# Patient Record
Sex: FEMALE | Race: OTHER | HISPANIC OR LATINO | Employment: OTHER | URBAN - METROPOLITAN AREA
[De-identification: names, ages, dates, MRNs, and addresses within clinical notes are randomized per-mention and may not be internally consistent; named-entity substitution may affect disease eponyms.]

---

## 2018-06-27 ENCOUNTER — OFFICE VISIT (OUTPATIENT)
Dept: FAMILY MEDICINE CLINIC | Facility: CLINIC | Age: 38
End: 2018-06-27

## 2018-06-27 VITALS
HEIGHT: 62 IN | WEIGHT: 154 LBS | BODY MASS INDEX: 28.34 KG/M2 | HEART RATE: 72 BPM | OXYGEN SATURATION: 98 % | RESPIRATION RATE: 16 BRPM | DIASTOLIC BLOOD PRESSURE: 70 MMHG | TEMPERATURE: 98 F | SYSTOLIC BLOOD PRESSURE: 120 MMHG

## 2018-06-27 DIAGNOSIS — M54.42 CHRONIC BILATERAL LOW BACK PAIN WITH LEFT-SIDED SCIATICA: ICD-10-CM

## 2018-06-27 DIAGNOSIS — N94.6 DYSMENORRHEA, UNSPECIFIED: ICD-10-CM

## 2018-06-27 DIAGNOSIS — M54.17 RADICULOPATHY OF LUMBOSACRAL REGION: Primary | ICD-10-CM

## 2018-06-27 DIAGNOSIS — G89.29 CHRONIC BILATERAL LOW BACK PAIN WITH LEFT-SIDED SCIATICA: ICD-10-CM

## 2018-06-27 PROBLEM — M54.9 BACKACHE: Status: ACTIVE | Noted: 2017-04-06

## 2018-06-27 PROCEDURE — 99214 OFFICE O/P EST MOD 30 MIN: CPT | Performed by: FAMILY MEDICINE

## 2018-06-27 RX ORDER — NORGESTIMATE AND ETHINYL ESTRADIOL 7DAYSX3 28
KIT ORAL EVERY 24 HOURS
COMMUNITY
Start: 2018-04-24 | End: 2018-10-09 | Stop reason: ALTCHOICE

## 2018-06-27 RX ORDER — LORATADINE 10 MG/1
10 TABLET ORAL
COMMUNITY
Start: 2018-02-22 | End: 2019-08-15 | Stop reason: ALTCHOICE

## 2018-06-27 RX ORDER — RANITIDINE 300 MG/1
CAPSULE ORAL EVERY 24 HOURS
COMMUNITY
Start: 2017-12-18 | End: 2019-08-15 | Stop reason: SDUPTHER

## 2018-06-27 RX ORDER — BACLOFEN 20 MG/1
TABLET ORAL
COMMUNITY
Start: 2017-04-05 | End: 2018-07-19 | Stop reason: SDUPTHER

## 2018-06-27 RX ORDER — OXYCODONE HYDROCHLORIDE AND ACETAMINOPHEN 5; 325 MG/1; MG/1
1 TABLET ORAL EVERY 6 HOURS PRN
Qty: 60 TABLET | Refills: 0 | Status: SHIPPED | OUTPATIENT
Start: 2018-06-27 | End: 2018-07-19 | Stop reason: SDUPTHER

## 2018-06-27 RX ORDER — OXYCODONE HYDROCHLORIDE AND ACETAMINOPHEN 5; 325 MG/1; MG/1
TABLET ORAL
COMMUNITY
Start: 2018-06-01 | End: 2018-06-27 | Stop reason: SDUPTHER

## 2018-06-27 RX ORDER — HYDROCODONE BITARTRATE AND ACETAMINOPHEN 5; 325 MG/1; MG/1
1 TABLET ORAL EVERY 6 HOURS
COMMUNITY
Start: 2016-01-06 | End: 2018-06-27 | Stop reason: CLARIF

## 2018-06-27 NOTE — ASSESSMENT & PLAN NOTE
The use of acetaminophen or naproxen was recommended in combination with her current medication of Percocet  She does physical work and her job description was reviewed today with her

## 2018-06-27 NOTE — ASSESSMENT & PLAN NOTE
Is a problem that has gone on for about six months  Patient visited the gynecologist for this situation  As she has been taking Naprosyn for the pain  She is unable to work for the past three days due to current dysmenorrhea  I offered to her progesterone injection but patient declines that for the gaining weight that she had in the past with this medication  She will be using better control also to control the pain with poor effect

## 2018-06-27 NOTE — PROGRESS NOTES
Assessment/Plan:    Radiculopathy of lumbosacral region  Patient is currently on narcotic medications  She is stable in this treatment  She is working in construction  She takes 1-4 tablets in the day depends of her duties  She also uses Naprosyn as needed for pain  Rachel Bejarano [address] of narcotics was review online  I explained the patient to taper her off medications  She insists on these medication controls her pain and she can continue an active life and working  She was requested to follow up with the Pain Management, the patient declines this since she has no insurance  I offered to her to use tramadol in the future combine with combination of any NSAID's  She tried in the past 50 mg within not controlling the pain,    Dysmenorrhea, unspecified  Is a problem that has gone on for about six months  Patient visited the gynecologist for this situation  As she has been taking Naprosyn for the pain  She is unable to work for the past three days due to current dysmenorrhea  I offered to her progesterone injection but patient declines that for the gaining weight that she had in the past with this medication  She will be using better control also to control the pain with poor effect  Backache  The use of acetaminophen or naproxen was recommended in combination with her current medication of Percocet  She does physical work and her job description was reviewed today with her  Diagnoses and all orders for this visit:    Radiculopathy of lumbosacral region  -     oxyCODONE-acetaminophen (PERCOCET) 5-325 mg per tablet; Take 1 tablet by mouth every 6 (six) hours as needed for moderate pain (as needed for pain) Max Daily Amount: 4 tablets    Chronic bilateral low back pain with left-sided sciatica    Dysmenorrhea, unspecified    Other orders  -     baclofen 20 mg tablet; Take by mouth  -     Discontinue: HYDROcodone-acetaminophen (NORCO) 5-325 mg per tablet;  Take 1 tablet by mouth every 6 (six) hours  -     loratadine (CLARITIN) 10 mg tablet; Take 10 mg by mouth  -     norgestimate-ethinyl estradiol (ORTHO TRI-CYCLEN,TRINESSA) 0 18/0 215/0 25 MG-35 MCG per tablet; every 24 hours  -     ranitidine (ZANTAC) 300 MG capsule; Take by mouth every 24 hours  -     Discontinue: oxyCODONE-acetaminophen (PERCOCET) 5-325 mg per tablet; take 1 tablet by oral route  every 6 hrs as needed for day if severe pain  Subjective:      Patient ID: Tomas Hardin is a 43 y o  female  Pt c/o lower back pain which has worsened over last week and numbness in her r great toe        The following portions of the patient's history were reviewed and updated as appropriate: allergies, current medications, past family history, past medical history, past social history, past surgical history and problem list     Review of Systems   HENT: Negative  Respiratory: Negative  Cardiovascular: Negative  Gastrointestinal: Positive for abdominal distention  Genitourinary: Negative  Musculoskeletal: Positive for back pain and myalgias  Neurological: Positive for numbness (She says that for the past one week her right great toe is numb)  Objective:      /70 (BP Location: Left arm, Patient Position: Sitting, Cuff Size: Standard)   Pulse 72   Temp 98 °F (36 7 °C) (Oral)   Resp 16   Ht 5' 2" (1 575 m)   Wt 69 9 kg (154 lb)   SpO2 98%   Breastfeeding? No   BMI 28 17 kg/m²          Physical Exam   Constitutional: She is oriented to person, place, and time  She appears well-developed and well-nourished  HENT:   Head: Normocephalic  Eyes: Pupils are equal, round, and reactive to light  Neck: Normal range of motion  Cardiovascular: Normal rate and regular rhythm  Pulmonary/Chest: Effort normal and breath sounds normal    Abdominal: Soft  She exhibits no distension     Musculoskeletal: She exhibits tenderness (Tenderness is more of her the paravertebral area as she has normal strength in both the lower extremities are  ROT are in  normal limits I do not see any abnormalities and higher right great toe)  Neurological: She is alert and oriented to person, place, and time  She has normal reflexes  Skin: Skin is warm and dry  Psychiatric: She has a normal mood and affect   Her behavior is normal  Judgment and thought content normal

## 2018-06-27 NOTE — ASSESSMENT & PLAN NOTE
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

## 2018-06-27 NOTE — LETTER
June 27, 2018     Patient: Devang Reed   YOB: 1980   Date of Visit: 6/27/2018       To Whom it May Concern:    Devang Reed is under my professional care  She was seen in my office on 6/27/2018  She may return to work on 06/28/2018  If you have any questions or concerns, please don't hesitate to call           Sincerely,          Sanjiv Doe MD        CC: No Recipients

## 2018-07-19 ENCOUNTER — DOCUMENTATION (OUTPATIENT)
Dept: FAMILY MEDICINE CLINIC | Facility: CLINIC | Age: 38
End: 2018-07-19

## 2018-07-19 ENCOUNTER — OFFICE VISIT (OUTPATIENT)
Dept: FAMILY MEDICINE CLINIC | Facility: CLINIC | Age: 38
End: 2018-07-19

## 2018-07-19 VITALS
HEART RATE: 83 BPM | BODY MASS INDEX: 28.71 KG/M2 | WEIGHT: 156 LBS | DIASTOLIC BLOOD PRESSURE: 70 MMHG | SYSTOLIC BLOOD PRESSURE: 110 MMHG | HEIGHT: 62 IN | TEMPERATURE: 98 F | OXYGEN SATURATION: 98 % | RESPIRATION RATE: 16 BRPM

## 2018-07-19 DIAGNOSIS — K04.7 DENTAL ABSCESS: Primary | ICD-10-CM

## 2018-07-19 DIAGNOSIS — M54.17 RADICULOPATHY OF LUMBOSACRAL REGION: ICD-10-CM

## 2018-07-19 PROCEDURE — 99214 OFFICE O/P EST MOD 30 MIN: CPT | Performed by: FAMILY MEDICINE

## 2018-07-19 RX ORDER — OXYCODONE HYDROCHLORIDE AND ACETAMINOPHEN 5; 325 MG/1; MG/1
1 TABLET ORAL EVERY 6 HOURS PRN
Qty: 120 TABLET | Refills: 0 | Status: SHIPPED | OUTPATIENT
Start: 2018-07-19 | End: 2018-07-19 | Stop reason: SDUPTHER

## 2018-07-19 RX ORDER — NAPROXEN 500 MG/1
500 TABLET ORAL 2 TIMES DAILY WITH MEALS
Qty: 60 TABLET | Refills: 5 | Status: SHIPPED | OUTPATIENT
Start: 2018-07-19 | End: 2019-08-15 | Stop reason: ALTCHOICE

## 2018-07-19 RX ORDER — CYCLOBENZAPRINE HCL 5 MG
5 TABLET ORAL 3 TIMES DAILY PRN
Qty: 90 TABLET | Refills: 0 | Status: SHIPPED | OUTPATIENT
Start: 2018-07-19 | End: 2018-08-13 | Stop reason: ALTCHOICE

## 2018-07-19 RX ORDER — OXYCODONE HYDROCHLORIDE AND ACETAMINOPHEN 5; 325 MG/1; MG/1
1 TABLET ORAL EVERY 6 HOURS PRN
Qty: 120 TABLET | Refills: 0 | Status: SHIPPED | OUTPATIENT
Start: 2018-07-19 | End: 2018-08-13 | Stop reason: SDUPTHER

## 2018-07-19 RX ORDER — AMOXICILLIN 500 MG/1
500 CAPSULE ORAL EVERY 8 HOURS SCHEDULED
Qty: 30 CAPSULE | Refills: 0 | Status: SHIPPED | OUTPATIENT
Start: 2018-07-19 | End: 2018-07-29

## 2018-07-19 NOTE — ASSESSMENT & PLAN NOTE
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

## 2018-07-19 NOTE — ASSESSMENT & PLAN NOTE
Treatment for dental abscess will be with amoxicillin, but I told the patient that she needs to follow up with dentistry

## 2018-07-19 NOTE — PROGRESS NOTES
Assessment/Plan:    No problem-specific Assessment & Plan notes found for this encounter  There are no diagnoses linked to this encounter  Subjective:      Patient ID: Cole Cobb is a 43 y o  female  Pt here with c/o worsening lower back pain  Her pain is worsen and she is not able to work for the past three days  She is also complaining of dental issues she does have a dentist in this area  The last appointment the patient was cut on her medication for pain due to tapering off this treatment  She does not use alcohol or other illegal drugs  Back Pain   This is a recurrent problem  The current episode started more than 1 year ago  The problem occurs constantly  The problem has been gradually worsening since onset  The pain is present in the lumbar spine and gluteal  The quality of the pain is described as shooting, burning and aching  The pain radiates to the left thigh and right thigh  The pain is at a severity of 8/10  The pain is severe  The pain is worse during the night  The symptoms are aggravated by lying down, position, sitting and twisting  Stiffness is present at night  Pertinent negatives include no abdominal pain, chest pain, fever or headaches  Risk factors include obesity  She has tried analgesics for the symptoms  The treatment provided no relief  The following portions of the patient's history were reviewed and updated as appropriate: allergies, current medications, past family history, past medical history, past social history, past surgical history and problem list     Review of Systems   Constitutional: Positive for unexpected weight change  Negative for fatigue and fever  HENT: Positive for mouth sores (She had dental decay with abscess and left upper gum  )  Negative for sore throat and trouble swallowing  Eyes: Negative for photophobia  Respiratory: Negative for cough, chest tightness, shortness of breath and wheezing      Cardiovascular: Negative for chest pain, palpitations and leg swelling  Gastrointestinal: Negative for abdominal pain, blood in stool, nausea and vomiting  Genitourinary: Negative for hematuria  Musculoskeletal: Positive for back pain  Neurological: Negative for dizziness, syncope, light-headedness and headaches  Hematological: Does not bruise/bleed easily  Objective:      /70 (BP Location: Left arm, Patient Position: Sitting, Cuff Size: Standard)   Pulse 83   Temp 98 °F (36 7 °C) (Oral)   Resp 16   Ht 5' 2" (1 575 m)   Wt 70 8 kg (156 lb)   SpO2 98%   Breastfeeding? No   BMI 28 53 kg/m²          Physical Exam   Constitutional: She is oriented to person, place, and time  She appears well-developed and well-nourished  Obese looking  HENT:   Head: Normocephalic  Mouth/Throat: Abnormal dentition  Dental abscesses (In the 2nd molar on the upper left gum ) present  Eyes: EOM are normal  Pupils are equal, round, and reactive to light  Neck: Neck supple  Cardiovascular: Normal rate, regular rhythm and normal heart sounds  Pulmonary/Chest: Effort normal and breath sounds normal    Abdominal: Soft  Bowel sounds are normal    Musculoskeletal: Normal range of motion  She exhibits tenderness (She has walking different difficulties, and having severe tenderness over the paraspinal muscles in the spinal processes the range of motion is very poor and she has weakness over the right leg and decrease reflexes of the right leg )  Neurological: She is alert and oriented to person, place, and time  She displays abnormal reflex (The patellar reflexes are normal other the right leg )  She exhibits abnormal muscle tone (Weakness is present in the muscles of the right leg )  Skin: Skin is warm and dry  Psychiatric: She has a normal mood and affect   Her behavior is normal  Judgment and thought content normal

## 2018-07-26 ENCOUNTER — DOCUMENTATION (OUTPATIENT)
Dept: FAMILY MEDICINE CLINIC | Facility: CLINIC | Age: 38
End: 2018-07-26

## 2018-08-13 ENCOUNTER — OFFICE VISIT (OUTPATIENT)
Dept: FAMILY MEDICINE CLINIC | Facility: CLINIC | Age: 38
End: 2018-08-13

## 2018-08-13 VITALS
HEART RATE: 85 BPM | SYSTOLIC BLOOD PRESSURE: 120 MMHG | HEIGHT: 62 IN | OXYGEN SATURATION: 98 % | BODY MASS INDEX: 28.52 KG/M2 | TEMPERATURE: 98.1 F | WEIGHT: 155 LBS | RESPIRATION RATE: 16 BRPM | DIASTOLIC BLOOD PRESSURE: 76 MMHG

## 2018-08-13 DIAGNOSIS — M54.17 RADICULOPATHY OF LUMBOSACRAL REGION: ICD-10-CM

## 2018-08-13 DIAGNOSIS — G89.29 CHRONIC BILATERAL LOW BACK PAIN WITH LEFT-SIDED SCIATICA: Primary | ICD-10-CM

## 2018-08-13 DIAGNOSIS — M54.42 CHRONIC BILATERAL LOW BACK PAIN WITH LEFT-SIDED SCIATICA: Primary | ICD-10-CM

## 2018-08-13 PROCEDURE — 99214 OFFICE O/P EST MOD 30 MIN: CPT | Performed by: FAMILY MEDICINE

## 2018-08-13 RX ORDER — CYCLOBENZAPRINE HCL 5 MG
TABLET ORAL
Qty: 90 TABLET | Refills: 5 | Status: SHIPPED | OUTPATIENT
Start: 2018-08-13 | End: 2018-08-13 | Stop reason: SDUPTHER

## 2018-08-13 RX ORDER — CYCLOBENZAPRINE HCL 5 MG
TABLET ORAL
Qty: 90 TABLET | Refills: 0 | Status: SHIPPED | OUTPATIENT
Start: 2018-08-13 | End: 2019-08-15 | Stop reason: HOSPADM

## 2018-08-13 RX ORDER — OXYCODONE HYDROCHLORIDE AND ACETAMINOPHEN 5; 325 MG/1; MG/1
1 TABLET ORAL EVERY 6 HOURS PRN
Qty: 120 TABLET | Refills: 0 | Status: SHIPPED | OUTPATIENT
Start: 2018-08-13 | End: 2018-09-10 | Stop reason: SDUPTHER

## 2018-08-13 NOTE — ASSESSMENT & PLAN NOTE
She is stable in current treatment, which is Percocet with cyclobenzaprine  We will continue the same  I reviewed [address] and i spoke with patient regarding use of narcotic, current issues with diversion and abuse of drugs  Patient understood the facts of missuse of prescribed medication may cause death of herself or other in diversion is the case  We will monitor medications with a ramdonized call to bring pills to be counted and make sure there is no diversion  Urine screnn also might be requested

## 2018-08-13 NOTE — PATIENT INSTRUCTIONS
Dolor crónico de espalda, cuidados ambulatorios   INFORMACIÓN GENERAL:   El dolor crónico de espalda  es un dolor que dura 3 meses o más y puede que incluya dolor que no ha sido controlado o no hart scott con tratamiento  Puede que rodriguez dolor de espalda cause debilidad o dolor que se propaga a david brazos o piernas  Busque cuidados inmediatos para los siguientes síntomas:   · Dolor severo    · Signos nuevos de adormecimiento o debilidad, sobre todo en la parte baja de la espalda, piernas, brazos o área genital    · Incapacidad de controlar rodriguez vejiga o necesidad de pasar las heces    · Jim o pérdida repentina de peso  El tratamiento para el dolor crónico de espalda  puede llegar a incluir cualquiera de los siguientes:  · Los antiiflamatorios no esteroideos (AINEs) ayudan a reducir inflamación y dolor o fiebre  Erna medicamento esta disponible con o sin haritha receta médica  Los AINEs podrían causar sangrado estomacal o problemas en los riñones en [place]  Si usted esta tomando un anticoágulante, siempre  pregunte si los AINEs son seguros para usted  Antes de [place], delia siempre la etiqueta de información y siga david indicaciones  No administre estos medicamentos a niños menores de 6 meses de edad sin el consentimiento de rodriguez médico       · El acetaminofén  disminuye el dolor y está disponible sin receta médica  Pregunte cuánto lilliana y con cuánta frecuencia tomarlo  Siga las indicaciones del medicamento  El acetaminofén puede causar daños al hígado si no se bee correctamente  · Podrían administrarse medicamentos para el dolor  Pregunte cómo tomarlos de haritha forma read  · Los relajantes musculares  ayudan a disminuir los espasmos musculares y el dolor de espalda  Maneje rodriguez dolor crónico de espalda:   · Aplique calor  a rodriguez espalda por 20 a 30 minutos cada 2 horas por los días indicados  El calor ayuda a disminuir el dolor y los espasmos musculares      · Permanezca activo  tanto rica pueda sin causar [employer]  Pregúntele a ramírez proveedor de adelaida cuáles ejercicios son correctos para usted  No se siente o acueste por largos periodos de [place], ya que esto puede empeorar ramírez dolor de [place]  Evite alzar cosas pesadas hasta que ramírez dolor se le haya quitado  · Vaya a terapia física según indicaciones  Un terapeuta físico le enseñará ejercicios para ayudar a mejorar el movimiento y la fuerza y para disminuir el dolor  Programe haritha sharyn de seguimiento con ramírez médico de cabecera según indicaciones:  Es probable que a usted lo refieran a un especialista en medicina del deporte o columna vertebral  Anote david preguntas para que las recuerde celine david citas de seguimiento  ACUERDOS SOBRE RAMÍREZ CUIDADO:   Usted tiene el derecho de participar en la planificación de ramírez cuidado  Aprenda todo lo que pueda sobre ramírez condición y rica darle tratamiento  Discuta con david médicos david opciones de tratamiento para juntos decidir el cuidado que usted quiere recibir  Usted siempre tiene el derecho a rechazar ramírez tratamiento  Esta información es sólo para uso en educación  Ramírez intención no es darle un consejo médico sobre enfermedades o tratamientos  Colsulte con ramírez Catherne J Carlos farmacéutico antes de seguir cualquier régimen médico para saber si es seguro y efectivo para usted  © 2014 [address] is for End User's use only and may not be sold, redistributed or otherwise used for commercial purposes  All illustrations and images included in CareNotes® are the copyrighted property of A D A M , Inc  or Marlon Zhang

## 2018-08-13 NOTE — PROGRESS NOTES
Assessment/Plan:    Radiculopathy of lumbosacral region   She is stable in current treatment, which is Percocet with cyclobenzaprine  We will continue the same  I reviewed [address] and i spoke with patient regarding use of narcotic, current issues with diversion and abuse of drugs  Patient understood the facts of missuse of prescribed medication may cause death of herself or other in diversion is the case  We will monitor medications with a ramdonized call to bring pills to be counted and make sure there is no diversion  Urine screnn also might be requested  Diagnoses and all orders for this visit:    Chronic bilateral low back pain with left-sided sciatica    Radiculopathy of lumbosacral region  -     cyclobenzaprine (FLEXERIL) 5 mg tablet; Take one tablet p o  3 times a day as needed for back pain          Subjective:      Patient ID: Tomas Hardin is a 43 y o  female  Pt here for monthly followup of back pain which is chronic  She is taking Percocet 5/325 every 6 hr as needed for pain 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      Back Pain   This is a recurrent problem  The current episode started more than 1 year ago  The problem occurs intermittently  The problem has been gradually improving since onset  The pain is present in the gluteal and lumbar spine  The quality of the pain is described as aching  The pain is at a severity of 4/10  The pain is moderate  The pain is the same all the time  The symptoms are aggravated by standing, twisting, sitting, lying down, position and bending  Risk factors include obesity  Treatments tried: narcotics  The treatment provided moderate relief         The following portions of the patient's history were reviewed and updated as appropriate: allergies, current medications, past family history, past medical history, past social history, past surgical history and problem list     Review of Systems   Musculoskeletal: Positive for back pain  Objective:      /76 (BP Location: Left arm, Patient Position: Sitting, Cuff Size: Standard)   Pulse 85   Temp 98 1 °F (36 7 °C) (Oral)   Resp 16   Ht 5' 2" (1 575 m)   Wt 70 3 kg (155 lb)   SpO2 98%   Breastfeeding? No   BMI 28 35 kg/m²          Physical Exam   Constitutional: She is oriented to person, place, and time  She appears well-developed and well-nourished  Cardiovascular: Normal rate, regular rhythm and normal heart sounds  Pulmonary/Chest: Effort normal and breath sounds normal    Abdominal: Soft  Bowel sounds are normal    Musculoskeletal: She exhibits tenderness  Neurological: She is alert and oriented to person, place, and time  She has normal reflexes  Skin: Skin is warm and dry  Psychiatric: She has a normal mood and affect   Her behavior is normal  Judgment and thought content normal

## 2018-09-10 ENCOUNTER — DOCUMENTATION (OUTPATIENT)
Dept: FAMILY MEDICINE CLINIC | Facility: CLINIC | Age: 38
End: 2018-09-10

## 2018-09-10 ENCOUNTER — OFFICE VISIT (OUTPATIENT)
Dept: FAMILY MEDICINE CLINIC | Facility: CLINIC | Age: 38
End: 2018-09-10

## 2018-09-10 VITALS
BODY MASS INDEX: 28.16 KG/M2 | HEIGHT: 62 IN | SYSTOLIC BLOOD PRESSURE: 110 MMHG | HEART RATE: 83 BPM | OXYGEN SATURATION: 98 % | RESPIRATION RATE: 16 BRPM | TEMPERATURE: 98.1 F | DIASTOLIC BLOOD PRESSURE: 80 MMHG | WEIGHT: 153 LBS

## 2018-09-10 DIAGNOSIS — M54.17 RADICULOPATHY OF LUMBOSACRAL REGION: ICD-10-CM

## 2018-09-10 DIAGNOSIS — N90.89 IRRITATION OF VULVA: Primary | ICD-10-CM

## 2018-09-10 PROCEDURE — 99214 OFFICE O/P EST MOD 30 MIN: CPT | Performed by: FAMILY MEDICINE

## 2018-09-10 RX ORDER — CLOTRIMAZOLE AND BETAMETHASONE DIPROPIONATE 10; .5 MG/ML; MG/ML
LOTION TOPICAL 2 TIMES DAILY
Qty: 30 ML | Refills: 0 | Status: SHIPPED | OUTPATIENT
Start: 2018-09-10 | End: 2018-10-09 | Stop reason: ALTCHOICE

## 2018-09-10 RX ORDER — OXYCODONE HYDROCHLORIDE AND ACETAMINOPHEN 5; 325 MG/1; MG/1
1 TABLET ORAL EVERY 6 HOURS PRN
Qty: 120 TABLET | Refills: 0 | Status: SHIPPED | OUTPATIENT
Start: 2018-09-10 | End: 2018-10-09 | Stop reason: SDUPTHER

## 2018-09-10 NOTE — ASSESSMENT & PLAN NOTE
I expect the patient that this medication is restricted for the severe pain on her lower back and radicular pain of the left leg  She has or other aches and pains and I requested to take   Naprosyn or Tylenol as needed

## 2018-09-10 NOTE — PROGRESS NOTES
Assessment/Plan:    Radiculopathy of lumbosacral region  I expect the patient that this medication is restricted for the severe pain on her lower back and radicular pain of the left leg  She has or other aches and pains and I requested to take   Naprosyn or Tylenol as needed  Irritation of vulva   giving her empiric treatment for possible Candida infection  She does not have heterosexual relation       Diagnoses and all orders for this visit:    Irritation of vulva  Comments:   giving her empiric treatment for possible Candida infection  She does not have heterosexual relation  Orders:  -     clotrimazole-betamethasone (LOTRISONE) 1-0 05 % lotion; Apply topically 2 (two) times a day    Radiculopathy of lumbosacral region  -     oxyCODONE-acetaminophen (PERCOCET) 5-325 mg per tablet; Take 1 tablet by mouth every 6 (six) hours as needed for moderate pain (as needed for pain) Max Daily Amount: 4 tablets          Subjective:      Patient ID: Pablo Peterson is a 43 y o  female  PT here for followup of chronic conditions      Back Pain   This is a recurrent problem  The current episode started more than 1 year ago  The problem occurs constantly  The problem is unchanged  The pain is present in the lumbar spine and gluteal  The quality of the pain is described as aching  The pain is at a severity of 4/10  The pain is moderate  The symptoms are aggravated by lying down, position, bending, twisting and sitting  Pertinent negatives include no abdominal pain, chest pain, fever or headaches  Risk factors include obesity  The treatment provided mild relief  The following portions of the patient's history were reviewed and updated as appropriate: allergies, current medications, past family history, past medical history, past social history, past surgical history and problem list     Review of Systems   Constitutional: Positive for unexpected weight change ( she is using weight)   Negative for fatigue and fever    HENT: Negative for sore throat and trouble swallowing  Eyes: Negative for photophobia  Respiratory: Negative for cough, chest tightness, shortness of breath and wheezing  Cardiovascular: Negative for chest pain, palpitations and leg swelling  Gastrointestinal: Negative for abdominal pain, blood in stool, nausea and vomiting  Genitourinary: Negative for hematuria  Full or irritation possible related to the contour of tissues or dirty  Hands,  Female partner has possible infection cardinal as per her report  Musculoskeletal: Positive for back pain ( this is a chronic issue)  Neurological: Negative for dizziness, syncope, light-headedness and headaches  Hematological: Does not bruise/bleed easily  Objective:      /80 (BP Location: Left arm, Patient Position: Sitting, Cuff Size: Standard)   Pulse 83   Temp 98 1 °F (36 7 °C) (Oral)   Resp 16   Ht 5' 2" (1 575 m)   Wt 69 4 kg (153 lb)   SpO2 98%   Breastfeeding? No   BMI 27 98 kg/m²          Physical Exam   Constitutional: She is oriented to person, place, and time  She appears well-developed and well-nourished  HENT:   Head: Normocephalic  Eyes: EOM are normal  Pupils are equal, round, and reactive to light  Neck: Neck supple  Cardiovascular: Normal rate and regular rhythm  Pulmonary/Chest: Effort normal and breath sounds normal    Abdominal: Soft  Bowel sounds are normal    Musculoskeletal: Normal range of motion  She exhibits tenderness ( decrease her range of motion, tenderness over the low the lower  Get the left leg weakness,  lack of sensation of the great toe of the foot  )  Neurological: She is alert and oriented to person, place, and time  She has normal reflexes  Skin: Skin is warm and dry  Psychiatric: She has a normal mood and affect   Her behavior is normal  Judgment and thought content normal

## 2018-09-10 NOTE — PATIENT INSTRUCTIONS
Ejercicio seguro   LO QUE NECESITA SABER:   ¿Qué es la seguridad del ejercicio? La seguridad del ejercicio incluye el uso de equipos y puestos correctos para trabbajar los músculos sin causar más daño  Usted deberá saber qué ejercicios hacer y con qué frecuencia hacerlos  También deberá saber qué hacer si siente o piensa que un ejercicio le provocó haritha Margo Night  No empiece ningún programa de ejercicios antes de hablar con rodriguez médico  Deberá esperar hasta que la hinchazón y el dolor se vayan para comenzar a ejercitarse  ¿Qué fara saber antes de ejercitar? · Los ejercicios ayudan a disminuir el dolor y la inflamación después de haritha Jenna Sat  También ayudan a fortalecer los músculos que go soporte a las articulaciones y [place]  [place] puede ayudar a prevenir otra Margo Night  · Puede que necesite haritha pesa liviana o haritha alicia de ejercicio para hacer algunos ejercicios  Rodriguez médico le indicará el peso con el que debe ejercitarse  [place] con rodriguez médico dónde comprar haritha pesa o haritha alicia de ejercicio  · Rodriguez médico le indicará la frecuencia de cada ejercico  También le dirá cuántas veces debe repetir cada ejercicio y cuántas series debe hacer  Le puede indicar hacer diferentes ejercicios en distintos días  · Entre en calor y estírese antes de hacer ejercicio  Use haritha bicicleta estacionaria o camine celine 5 a 10 minutos para que david músculos entren en calor  El [address] a aumentar el rango de [place]  También puede aliviar el dolor muscular y ayudar a prevenir otra Margo Night  Rodriguez médico le mostrará qué estiramientos debe hacer  ¿Qué fara hacer para ejercitar de haritha forma read? · Muévase lenta y suavemente  Evite movimientos rápidos o bruscos  [place] ayudará a evitar otra lesión  · Respire normalmente  No contenga la respiración  Es importante inspirar y exhalar para no tensionarse celine el ejercicio  La tensión le podría impedir  la articulación en un rango completo de movimiento  · Deténgase si siente dolor lily o aumento del dolor  Suspenda el ejercicio y póngase en contacto con rodriguez médico si se presentan estos síntomas  Es normal sentir Mehdi Lorena, rica dolor sordo, celine el ejercicio  Practicar los ejercicios con regularidad ayudará a disminuir rodriguez incomodidad con el paso del [place]  ¿Cuándo fara comunicarme con mi médico?   · Tiene dolor lily cuando hace ejercicio o está en reposo  · Tiene preguntas o inquietudes acerca de los estiramientos o los ejercicios  ACUERDOS SOBRE RODRIGUEZ CUIDADO:   Usted tiene el derecho de ayudar a planear rodriguez cuidado  Aprenda todo lo que pueda sobre rodriguez condición y rica darle tratamiento  Discuta david opciones de tratamiento con david médicos para decidir el cuidado que usted desea recibir  Usted siempre tiene el derecho de rechazar el tratamiento  Esta información es sólo para uso en educación  Rodriguez intención no es darle un consejo médico sobre enfermedades o tratamientos  Colsulte con rodriguez Suzen [place] farmacéutico antes de seguir cualquier régimen médico para saber si es seguro y efectivo para usted  © 2017 [facility] Information is for End User's use only and may not be sold, redistributed or otherwise used for commercial purposes  All illustrations and images included in CareNotes® are the copyrighted property of A D A M , Inc  or Marlon Zhang

## 2018-09-10 NOTE — ASSESSMENT & PLAN NOTE
giving her empiric treatment for possible Candida infection      She does not have heterosexual relation

## 2018-10-09 ENCOUNTER — OFFICE VISIT (OUTPATIENT)
Dept: FAMILY MEDICINE CLINIC | Facility: CLINIC | Age: 38
End: 2018-10-09

## 2018-10-09 VITALS
BODY MASS INDEX: 28.71 KG/M2 | SYSTOLIC BLOOD PRESSURE: 120 MMHG | DIASTOLIC BLOOD PRESSURE: 70 MMHG | RESPIRATION RATE: 16 BRPM | WEIGHT: 156 LBS | TEMPERATURE: 98 F | HEART RATE: 92 BPM | HEIGHT: 62 IN | OXYGEN SATURATION: 98 %

## 2018-10-09 DIAGNOSIS — M54.17 RADICULOPATHY OF LUMBOSACRAL REGION: ICD-10-CM

## 2018-10-09 PROCEDURE — 99214 OFFICE O/P EST MOD 30 MIN: CPT | Performed by: FAMILY MEDICINE

## 2018-10-09 RX ORDER — OXYCODONE HYDROCHLORIDE AND ACETAMINOPHEN 5; 325 MG/1; MG/1
1 TABLET ORAL EVERY 8 HOURS PRN
Qty: 90 TABLET | Refills: 0 | Status: SHIPPED | OUTPATIENT
Start: 2018-10-09 | End: 2018-11-05 | Stop reason: SDUPTHER

## 2018-10-09 NOTE — PROGRESS NOTES
Assessment/Plan:  1  Radiculopathy of lumbosacral region    Patient has no h/o of illegal use of drugs of alcohol abuse  Patient is on uninsured, she worsen construction area  Pain is under control on current treatment  I explained the patient that she needs to go to physical therapy and pain management  She declines the option  If she does not have compliance with the recommendation we will not continue pain management therapy, this situation was is widely explained to patient  I reviewed [address] and i spoke with patient regarding use of narcotic, current issues with diversion and abuse of drugs  Patient understood the facts of missuse of prescribed medication may cause death of herself or other in diversion is the case  We will monitor medications with a ramdonized call to bring pills to be counted and make sure there is no diversion  Urine screnn also might be requested  - oxyCODONE-acetaminophen (PERCOCET) 5-325 mg per tablet; Take 1 tablet by mouth every 8 (eight) hours as needed for moderate pain (as needed for pain) Max Daily Amount: 3 tablets  Dispense: 90 tablet; Refill: 0    No problem-specific Assessment & Plan notes found for this encounter  Diagnoses and all orders for this visit:    Radiculopathy of lumbosacral region  -     oxyCODONE-acetaminophen (PERCOCET) 5-325 mg per tablet; Take 1 tablet by mouth every 8 (eight) hours as needed for moderate pain (as needed for pain) Max Daily Amount: 3 tablets          Subjective:      Patient ID: Araceli Guy is a 43 y o  female  Pt here with complaints of increased right knee pain  Knee Pain    The incident occurred more than 1 week ago  There was no injury mechanism  The pain is present in the right knee  The quality of the pain is described as aching  The pain is at a severity of 5/10  The pain is moderate  The pain has been fluctuating since onset   Associated symptoms include a loss of motion  The symptoms are aggravated by movement  She has tried nothing for the symptoms  Back Pain   Pertinent negatives include no abdominal pain, chest pain, fever or headaches  The following portions of the patient's history were reviewed and updated as appropriate: allergies, current medications, past family history, past medical history, past social history, past surgical history and problem list     Review of Systems   Constitutional: Negative for fatigue, fever and unexpected weight change  HENT: Negative for sore throat and trouble swallowing  Eyes: Negative for photophobia  Respiratory: Negative for cough, chest tightness, shortness of breath and wheezing  Cardiovascular: Negative for chest pain, palpitations and leg swelling  Gastrointestinal: Negative for abdominal pain, blood in stool, nausea and vomiting  Genitourinary: Negative for hematuria  Musculoskeletal: Positive for arthralgias (Posterior area of the leg pain) and back pain ( back pain 7/10 when worsening to over 10 when taking medication  )  Right knee pain   Neurological: Negative for dizziness, syncope, light-headedness and headaches  Hematological: Does not bruise/bleed easily  Psychiatric/Behavioral:        Patient had no history of illegal drug use or alcohol abuse         Objective:      /70 (BP Location: Left arm, Patient Position: Sitting, Cuff Size: Standard)   Pulse 92   Temp 98 °F (36 7 °C) (Oral)   Resp 16   Ht 5' 2" (1 575 m)   Wt 70 8 kg (156 lb)   SpO2 98%   Breastfeeding? 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

## 2018-10-09 NOTE — PATIENT INSTRUCTIONS
Oxicodona , liberación rápida (Por la boca)   Se [place] para tratar el dolor de moderado a severo  Erna medicamento es un narcótico para el dolor  Zahraa(s) : Oxaydo, Oxy IR, Roxicodone   Existen muchas otras marcas de Juliane  Erna medicamento no debe ser usado cuando:   Erna medicamento no es adecuado para todas las personas  No lo use si ha tenido renuka reacción alérgica a la oxicodona, la codeína, la hidrocodona, la dihidrocodeína o la morfina, o si usted sufre de un bloqueo estomacal o intestinal   Forma de usar erna medicamento:   Yosef Líquido, Tableta  · Normal david medicamentos rica se le haya indicado  Es probable que sea necesario cambiar rodriguez dosis varias veces hasta encontrar la que funciona mejor para usted  · Renuka sobredosis puede ser Rexanne Meme  Siga las instrucciones con cuidado para no lilliana [facility] cantidad del medicamento de renuka daniel vez  · Solución oral: Mida el líquido oral con Kennieth Kinds, jeringa para uso oral o taza especialmente marcadas para medir medicamentos  · Tableta de Oxaydo®: Trague la tableta entera con renuka cantidad suficiente de agua  No la parta, triture, mastique, ni la disuelva  No humedezca la tableta antes de ponérsela en la boca  · Juliane debe venir con [facility] Guía del medicamento  Solicite renuka copia con rodriguez farmacéutico en lashawn de no tener la guía  · Si olvida renuka dosis: Si olvida renuka dosis de rodriguez medicamento, tómelo lo más pronto posible  Si es mabel la hora para rodriguez próxima dosis, espere hasta entonces para lilliana rodriguez dosis regular  No use medicamento adicional para reponer la dosis olvidada  · Guarde el medicamento en un recipiente cerrado a temperatura ambiente y alejado del calor, la humedad y la mireille directa  Guarde erna medicamento en un lugar seguro para prevenir que otros lo tomen  Consulte con rodriguez farmacéutico sobre la mejor forma de botar el medicamento que no haya usado    Medicamentos y [employer] que debe evitar:   Consulte con rodriguez médico o farmacéutico antes de usar Deirdre Roscoe, [facility] Laura que compra sin receta médica, las vitaminas y los productos herbales  · No use erna medicamento si usted ha usado un inhibidor de la monoaminooxidasa (IMAO) en los últimos 14 días  · Algunos medicamentos pueden afectar la eficacia de oxicodona  Informe a rodriguez médico si está usando cualquiera de los siguientes:  ¨ Amiodarona, Cegdel, eritromicina, ketoconazol, fenitoína, quinidina, rifampicina, ritonavir  ¨ Diurético  ¨ Medicamentos para tratar la depresión o la ansiedad  ¨ Medicamento para tratar las migrañas  ¨ Medicamentos con fenotiazina  · Informe a rodriguez médico si usted [place] cualquier cosa que le provoca sueño  Darolyn Canavan son medicamentos para alergia o medicamentos narcóticos para el dolor y el alcohol  Informe a rodriguez médico si usted [employer] buprenorfina, butorfanol, nalbufina, pentazocina o un relajante muscular  · No consuma alcohol mientras esté   Precauciones celine el uso de erna medicamento:   · Informe a rodriguez médico si usted está embarazada o amamantando, o si tiene enfermedad renal, enfermedad hepática, enfermedad cardíaca, presión arterial baja, enfermedad pulmonar o problemas respiratorios (rica asma, EPOC), escoliosis, agrandamiento de la próstata o dificultad para orinar, hipotiroidismo, enfermedad de [place], problemas en la vesícula biliar o el páncreas, o problemas digestivos  Infórmele a rodriguez médico si usted tiene antecedentes de lesiones en la maico, tumores cerebrales, problemas mentales, convulsiones o adicción a las drogas o el alcohol  · Erna medicamento puede causar los siguientes problemas:  ¨ Mayor riesgo de sobredosis, que puede conducir a la muerte  ¨ La depresión respiratoria (problema respiratorio grave que puede ser mortal)  ¨ Síndrome de la serotonina, cuando se [place] con otros medicamentos  · [employer] puede causar que usted sienta mareivana, waylon o Mara   No maneje un vehículo ni clifton ninguna tarea que pueda ser peligrosa hasta que usted sepa cómo lo afecta erna medicamento  Siéntese o acuéstese si se siente mareado  Póngase de pie con cuidado  · Erna medicamento puede convertirse en un hábito  No use más de la dosis prescrita  Llame a rodriguez médico si usted siente que el medicamento no le está funcionando  · No suspenda el uso de erna medicamento súbitamente  Rodriguez médico necesitará disminuir rodriguez dosis poco a poco antes de suspender el medicamento por completo  · Erna medicamento puede causar el estreñimiente, especialmente si usted lo [place] celine [employer]  Pregúntele a rodriguez médico si usted también debería usar un laxante para prevenir y tratar el estreñimiento  [place] muchos líquidos para evitar el estreñimiento  · Puede que erna medicamento cause infertilidad  Hable con rodriguez médico antes de usar erna medicamento si usted planea tener hijos  · Guarde todos los medicamentos fuera del alcance de los niños  Nunca comparta david medicamentos con [employer]    Efectos secundarios que pueden presentarse celine el uso de erna medicamento:   Consulte inmediatamente con el médico si nota cualquiera de estos efectos secundarios:  · Reacción alérgica: Comezón o ronchas, hinchazón del stalin o las syeda, hinchazón u hormigueo en la boca o garganta, opresión en el pecho, dificultad para respirar  · Ansiedad, inquietud, ritmo cardíaco acelerado, fiebre, transpiración, espasmos musculares, estremecimientos, náusea, vómito, diarrea, evaristo o escuchar cosas inexistentes  · Labios, uñas o piel azulado, dificultad para respirar  · Mareo y debilidad extremos, respiración superficial, ritmo cardíaco lento, sudor, piel fría o pegajosa, convulsiones  · Desvanecimientos, mareos, desmayos  · Estreñimiento severo, dolor estomacal  Consulte con el médico si nota los siguientes efectos secundarios menos graves:   · Estreñimiento leve  · Somnolencia, cansancio  Consulte con el médico si nota otros efectos secundarios que yaz son causados por salima medicamento  Llame a trammell médico para consultarle Chano Otto puede notificar david efectos secundarios al FDA al [phone]  © 2017 [address] Information is for End User's use only and may not be sold, redistributed or otherwise used for commercial purposes  Esta información es sólo para uso en educación  Trammell intención no es darle un consejo médico sobre enfermedades o tratamientos  Colsulte con trammell Martina Seals farmacéutico antes de seguir cualquier régimen médico para saber si es seguro y efectivo para usted

## 2018-11-05 ENCOUNTER — OFFICE VISIT (OUTPATIENT)
Dept: FAMILY MEDICINE CLINIC | Facility: CLINIC | Age: 38
End: 2018-11-05

## 2018-11-05 VITALS
HEART RATE: 73 BPM | HEIGHT: 62 IN | RESPIRATION RATE: 16 BRPM | WEIGHT: 156 LBS | SYSTOLIC BLOOD PRESSURE: 120 MMHG | OXYGEN SATURATION: 98 % | BODY MASS INDEX: 28.71 KG/M2 | TEMPERATURE: 98 F | DIASTOLIC BLOOD PRESSURE: 70 MMHG

## 2018-11-05 DIAGNOSIS — M54.17 RADICULOPATHY OF LUMBOSACRAL REGION: Primary | ICD-10-CM

## 2018-11-05 PROCEDURE — 99213 OFFICE O/P EST LOW 20 MIN: CPT | Performed by: FAMILY MEDICINE

## 2018-11-05 RX ORDER — GABAPENTIN 100 MG/1
100 CAPSULE ORAL 3 TIMES DAILY
Qty: 60 CAPSULE | Refills: 5 | Status: SHIPPED | OUTPATIENT
Start: 2018-11-05 | End: 2019-08-15 | Stop reason: SDUPTHER

## 2018-11-05 RX ORDER — OXYCODONE HYDROCHLORIDE AND ACETAMINOPHEN 5; 325 MG/1; MG/1
1 TABLET ORAL EVERY 6 HOURS PRN
Qty: 120 TABLET | Refills: 0 | Status: SHIPPED | OUTPATIENT
Start: 2018-11-05

## 2018-11-05 RX ORDER — METHYLPREDNISOLONE 4 MG/1
TABLET ORAL
Qty: 21 TABLET | Refills: 0 | Status: SHIPPED | OUTPATIENT
Start: 2018-11-05 | End: 2019-08-15 | Stop reason: ALTCHOICE

## 2018-11-05 NOTE — PATIENT INSTRUCTIONS
Chronic Back Pain   Susana Ramon: Musculoskeletal Back Pain  In: [facility], Олег RS, Emily RM, et al, The Dalila Faria's Emergency Medicine: Concepts and Clinical Practice, 7th ed  [address], [place], 2010  Zuly BE & Melissa N: Chronic Low Back Pain  In: Rhesa Fothergill, Brenda Granger, Yee Amaya, eds  Bonica's Management of Pain, 4th ed  [address] [facility],[address] [place] [address], 2010  [address]: Back Pain  In: Charlene Nieves MW, ed  The 5-Minute Pediatric Consult, 6th ed  [facility],[address] [place] [address], 2012  Jonathan R: Treatment: current treatment recommendations for acute and chronic undifferentiated low back pain  [address] [facility] 2012; 39(3):481-486  © 2017 2600 D[address] Information is for End User's use only and may not be sold, redistributed or otherwise used for commercial purposes  All illustrations and images included in CareNotes® are the copyrighted property of A D A M , Inc  or Marlon Zhang  The above information is an  only  It is not intended as medical advice for individual conditions or treatments  Talk to your doctor, nurse or pharmacist before following any medical regimen to see if it is safe and effective for you

## 2018-11-05 NOTE — PROGRESS NOTES
Assessment/Plan:  1  Radiculopathy of lumbosacral region  I reviewed [facility] and i spoke with patient regarding use of narcotic, She needs to follow with Pain Management specialist since this office will be signing off all chronic pain treatment  - oxyCODONE-acetaminophen (PERCOCET) 5-325 mg per tablet; Take 1 tablet by mouth every 6 (six) hours as needed for moderate pain (as needed for pain) Max Daily Amount: 4 tablets  Dispense: 120 tablet; Refill: 0  - Ambulatory referral to Pain Management; Future    No problem-specific Assessment & Plan notes found for this encounter  Diagnoses and all orders for this visit:    Radiculopathy of lumbosacral region  -     oxyCODONE-acetaminophen (PERCOCET) 5-325 mg per tablet; Take 1 tablet by mouth every 6 (six) hours as needed for moderate pain (as needed for pain) Max Daily Amount: 4 tablets  -     Ambulatory referral to Pain Management; Future  -     gabapentin (NEURONTIN) 100 mg capsule; Take 1 capsule (100 mg total) by mouth 3 (three) times a day  -     Methylprednisolone 4 MG TBPK; Use as directed on package          Subjective:      Patient ID: Bayron Steve is a 43 y o  female  Pt here for monthly followup      Back Pain   This is a chronic problem  The current episode started more than 1 year ago  The problem occurs constantly  The problem is unchanged  The pain is present in the gluteal and lumbar spine  The pain is at a severity of 5/10  The pain is moderate  The symptoms are aggravated by position, standing, sitting and twisting  Pertinent negatives include no abdominal pain, chest pain, fever or headaches  Treatments tried: narcotics  The treatment provided mild relief  Knee Pain    There was no injury mechanism  The pain is present in the right knee  The quality of the pain is described as aching  The pain is at a severity of 5/10  The pain is moderate   The pain has been worsening since onset  The symptoms are aggravated by movement  Treatments tried: narcotics  The treatment provided mild relief  The following portions of the patient's history were reviewed and updated as appropriate: allergies, current medications, past family history, past medical history, past social history, past surgical history and problem list     Review of Systems   Constitutional: Negative for fatigue, fever and unexpected weight change  HENT: Negative for sore throat and trouble swallowing  Eyes: Negative for photophobia  Respiratory: Negative for cough, chest tightness, shortness of breath and wheezing  Cardiovascular: Negative for chest pain, palpitations and leg swelling  Gastrointestinal: Negative for abdominal pain, blood in stool, nausea and vomiting  Genitourinary: Negative for hematuria  Musculoskeletal: Positive for back pain (witha pain radiated to right leg )  Right knee pain   Neurological: Negative for dizziness, syncope, light-headedness and headaches  Hematological: Does not bruise/bleed easily  Objective:      /70 (BP Location: Left arm, Patient Position: Sitting, Cuff Size: Standard)   Pulse 73   Temp 98 °F (36 7 °C) (Oral)   Resp 16   Ht 5' 2" (1 575 m)   Wt 70 8 kg (156 lb)   SpO2 98%   Breastfeeding? No   BMI 28 53 kg/m²          Physical Exam   Constitutional: She is oriented to person, place, and time  She appears well-developed and well-nourished  Cardiovascular: Normal rate, regular rhythm and normal heart sounds  Pulmonary/Chest: Effort normal and breath sounds normal    Abdominal: Soft  Bowel sounds are normal    Musculoskeletal: She exhibits tenderness (lower back tenderness and knee pain, right )  Neurological: She is alert and oriented to person, place, and time  She has normal reflexes  Skin: Skin is warm and dry  Psychiatric: She has a normal mood and affect   Her behavior is normal  Judgment and thought content normal

## 2019-06-08 ENCOUNTER — HOSPITAL ENCOUNTER (EMERGENCY)
Facility: HOSPITAL | Age: 39
Discharge: HOME | End: 2019-06-08
Attending: EMERGENCY MEDICINE

## 2019-06-08 ENCOUNTER — APPOINTMENT (EMERGENCY)
Dept: CT IMAGING | Facility: HOSPITAL | Age: 39
End: 2019-06-08

## 2019-06-08 VITALS
RESPIRATION RATE: 18 BRPM | TEMPERATURE: 98 F | SYSTOLIC BLOOD PRESSURE: 128 MMHG | BODY MASS INDEX: 32.01 KG/M2 | HEART RATE: 73 BPM | WEIGHT: 175 LBS | DIASTOLIC BLOOD PRESSURE: 75 MMHG | OXYGEN SATURATION: 100 %

## 2019-06-08 DIAGNOSIS — R10.9 ABDOMINAL PAIN: Primary | ICD-10-CM

## 2019-06-08 DIAGNOSIS — R11.2 NAUSEA AND VOMITING: ICD-10-CM

## 2019-06-08 LAB
ALBUMIN SERPL BCP-MCNC: 4.6 G/DL (ref 3–5.2)
ALBUMIN SERPL BCP-MCNC: 4.7 G/DL (ref 3–5.2)
ALP SERPL-CCNC: 105 U/L (ref 43–122)
ALP SERPL-CCNC: 72 U/L (ref 43–122)
ALT SERPL W P-5'-P-CCNC: 10 U/L (ref 9–52)
ALT SERPL W P-5'-P-CCNC: 18 U/L (ref 9–52)
ANION GAP SERPL CALCULATED.3IONS-SCNC: 13 MMOL/L (ref 5–14)
ANION GAP SERPL CALCULATED.3IONS-SCNC: 8 MMOL/L (ref 5–14)
AST SERPL W P-5'-P-CCNC: 27 U/L (ref 14–36)
AST SERPL W P-5'-P-CCNC: 50 U/L (ref 14–36)
BASOPHILS # BLD AUTO: 0.1 THOUSANDS/ΜL (ref 0–0.1)
BASOPHILS NFR BLD AUTO: 1 % (ref 0–1)
BILIRUB SERPL-MCNC: 0.5 MG/DL
BILIRUB SERPL-MCNC: 1.6 MG/DL
BUN SERPL-MCNC: 10 MG/DL (ref 5–25)
BUN SERPL-MCNC: 11 MG/DL (ref 5–25)
CALCIUM SERPL-MCNC: 8.7 MG/DL (ref 8.4–10.2)
CALCIUM SERPL-MCNC: 9 MG/DL (ref 8.4–10.2)
CHLORIDE SERPL-SCNC: 102 MMOL/L (ref 97–108)
CHLORIDE SERPL-SCNC: 103 MMOL/L (ref 97–108)
CO2 SERPL-SCNC: 25 MMOL/L (ref 22–30)
CO2 SERPL-SCNC: 26 MMOL/L (ref 22–30)
CREAT SERPL-MCNC: 0.59 MG/DL (ref 0.6–1.2)
CREAT SERPL-MCNC: 0.6 MG/DL (ref 0.6–1.2)
EOSINOPHIL # BLD AUTO: 0.1 THOUSAND/ΜL (ref 0–0.4)
EOSINOPHIL NFR BLD AUTO: 1 % (ref 0–6)
ERYTHROCYTE [DISTWIDTH] IN BLOOD BY AUTOMATED COUNT: 13.3 %
EXT PREG TEST URINE: NORMAL
GFR SERPL CREATININE-BSD FRML MDRD: 114 ML/MIN/1.73SQ M
GFR SERPL CREATININE-BSD FRML MDRD: 115 ML/MIN/1.73SQ M
GLUCOSE SERPL-MCNC: 84 MG/DL (ref 70–99)
GLUCOSE SERPL-MCNC: 94 MG/DL (ref 70–99)
HCT VFR BLD AUTO: 41.2 % (ref 36–46)
HGB BLD-MCNC: 13.9 G/DL (ref 12–16)
LIPASE SERPL-CCNC: 106 U/L (ref 23–300)
LYMPHOCYTES # BLD AUTO: 2.1 THOUSANDS/ΜL (ref 0.5–4)
LYMPHOCYTES NFR BLD AUTO: 22 % (ref 25–45)
MCH RBC QN AUTO: 30 PG (ref 26–34)
MCHC RBC AUTO-ENTMCNC: 33.8 G/DL (ref 31–36)
MCV RBC AUTO: 89 FL (ref 80–100)
MONOCYTES # BLD AUTO: 0.6 THOUSAND/ΜL (ref 0.2–0.9)
MONOCYTES NFR BLD AUTO: 6 % (ref 1–10)
NEUTROPHILS # BLD AUTO: 6.9 THOUSANDS/ΜL (ref 1.8–7.8)
NEUTS SEG NFR BLD AUTO: 71 % (ref 45–65)
PLATELET # BLD AUTO: 284 THOUSANDS/UL (ref 150–450)
PMV BLD AUTO: 8.1 FL (ref 8.9–12.7)
POTASSIUM SERPL-SCNC: 3.7 MMOL/L (ref 3.6–5)
POTASSIUM SERPL-SCNC: 5.9 MMOL/L (ref 3.6–5)
PROT SERPL-MCNC: 8 G/DL (ref 5.9–8.4)
PROT SERPL-MCNC: 8.3 G/DL (ref 5.9–8.4)
RBC # BLD AUTO: 4.65 MILLION/UL (ref 4–5.2)
SODIUM SERPL-SCNC: 137 MMOL/L (ref 137–147)
SODIUM SERPL-SCNC: 140 MMOL/L (ref 137–147)
WBC # BLD AUTO: 9.7 THOUSAND/UL (ref 4.5–11)

## 2019-06-08 PROCEDURE — 80053 COMPREHEN METABOLIC PANEL: CPT | Performed by: PHYSICIAN ASSISTANT

## 2019-06-08 PROCEDURE — 96361 HYDRATE IV INFUSION ADD-ON: CPT

## 2019-06-08 PROCEDURE — 83690 ASSAY OF LIPASE: CPT | Performed by: PHYSICIAN ASSISTANT

## 2019-06-08 PROCEDURE — 96374 THER/PROPH/DIAG INJ IV PUSH: CPT

## 2019-06-08 PROCEDURE — 93005 ELECTROCARDIOGRAM TRACING: CPT

## 2019-06-08 PROCEDURE — 81025 URINE PREGNANCY TEST: CPT | Performed by: PHYSICIAN ASSISTANT

## 2019-06-08 PROCEDURE — C9113 INJ PANTOPRAZOLE SODIUM, VIA: HCPCS | Performed by: PHYSICIAN ASSISTANT

## 2019-06-08 PROCEDURE — 74177 CT ABD & PELVIS W/CONTRAST: CPT

## 2019-06-08 PROCEDURE — 99283 EMERGENCY DEPT VISIT LOW MDM: CPT | Performed by: PHYSICIAN ASSISTANT

## 2019-06-08 PROCEDURE — 99284 EMERGENCY DEPT VISIT MOD MDM: CPT

## 2019-06-08 PROCEDURE — 96375 TX/PRO/DX INJ NEW DRUG ADDON: CPT

## 2019-06-08 PROCEDURE — 85025 COMPLETE CBC W/AUTO DIFF WBC: CPT | Performed by: PHYSICIAN ASSISTANT

## 2019-06-08 PROCEDURE — 36415 COLL VENOUS BLD VENIPUNCTURE: CPT | Performed by: PHYSICIAN ASSISTANT

## 2019-06-08 RX ORDER — ONDANSETRON 2 MG/ML
4 INJECTION INTRAMUSCULAR; INTRAVENOUS ONCE
Status: COMPLETED | OUTPATIENT
Start: 2019-06-08 | End: 2019-06-08

## 2019-06-08 RX ORDER — LIDOCAINE HYDROCHLORIDE 20 MG/ML
15 SOLUTION OROPHARYNGEAL 4 TIMES DAILY PRN
Status: DISCONTINUED | OUTPATIENT
Start: 2019-06-08 | End: 2019-06-08 | Stop reason: HOSPADM

## 2019-06-08 RX ORDER — MAGNESIUM HYDROXIDE/ALUMINUM HYDROXICE/SIMETHICONE 120; 1200; 1200 MG/30ML; MG/30ML; MG/30ML
30 SUSPENSION ORAL EVERY 4 HOURS PRN
Status: DISCONTINUED | OUTPATIENT
Start: 2019-06-08 | End: 2019-06-08 | Stop reason: HOSPADM

## 2019-06-08 RX ORDER — SUCRALFATE ORAL 1 G/10ML
1000 SUSPENSION ORAL ONCE
Status: COMPLETED | OUTPATIENT
Start: 2019-06-08 | End: 2019-06-08

## 2019-06-08 RX ORDER — ONDANSETRON 4 MG/1
4 TABLET, ORALLY DISINTEGRATING ORAL EVERY 8 HOURS PRN
Qty: 20 TABLET | Refills: 0 | Status: SHIPPED | OUTPATIENT
Start: 2019-06-08 | End: 2019-08-15 | Stop reason: ALTCHOICE

## 2019-06-08 RX ORDER — SUCRALFATE ORAL 1 G/10ML
1 SUSPENSION ORAL 4 TIMES DAILY
Qty: 420 ML | Refills: 0 | Status: SHIPPED | OUTPATIENT
Start: 2019-06-08 | End: 2019-08-15 | Stop reason: SDUPTHER

## 2019-06-08 RX ORDER — PANTOPRAZOLE SODIUM 40 MG/1
40 INJECTION, POWDER, FOR SOLUTION INTRAVENOUS ONCE
Status: COMPLETED | OUTPATIENT
Start: 2019-06-08 | End: 2019-06-08

## 2019-06-08 RX ADMIN — PANTOPRAZOLE SODIUM 40 MG: 40 INJECTION, POWDER, LYOPHILIZED, FOR SOLUTION INTRAVENOUS at 12:39

## 2019-06-08 RX ADMIN — LIDOCAINE HYDROCHLORIDE 15 ML: 20 SOLUTION ORAL; TOPICAL at 14:15

## 2019-06-08 RX ADMIN — SUCRALFATE 1000 MG: 1 SUSPENSION ORAL at 14:16

## 2019-06-08 RX ADMIN — ONDANSETRON 4 MG: 2 INJECTION INTRAMUSCULAR; INTRAVENOUS at 12:41

## 2019-06-08 RX ADMIN — IOHEXOL 100 ML: 350 INJECTION, SOLUTION INTRAVENOUS at 13:23

## 2019-06-08 RX ADMIN — ALUMINUM HYDROXIDE, MAGNESIUM HYDROXIDE, AND SIMETHICONE 30 ML: 200; 200; 20 SUSPENSION ORAL at 14:14

## 2019-06-08 RX ADMIN — SODIUM CHLORIDE 1000 ML: 9 INJECTION, SOLUTION INTRAVENOUS at 12:38

## 2019-06-09 LAB
ATRIAL RATE: 70 BPM
P AXIS: 47 DEGREES
PR INTERVAL: 152 MS
QRS AXIS: 28 DEGREES
QRSD INTERVAL: 76 MS
QT INTERVAL: 400 MS
QTC INTERVAL: 432 MS
T WAVE AXIS: 8 DEGREES
VENTRICULAR RATE: 70 BPM

## 2019-06-09 PROCEDURE — 93010 ELECTROCARDIOGRAM REPORT: CPT | Performed by: INTERNAL MEDICINE

## 2019-08-12 ENCOUNTER — TELEPHONE (OUTPATIENT)
Dept: OBGYN CLINIC | Facility: CLINIC | Age: 39
End: 2019-08-12

## 2019-08-15 ENCOUNTER — OFFICE VISIT (OUTPATIENT)
Dept: FAMILY MEDICINE CLINIC | Facility: CLINIC | Age: 39
End: 2019-08-15

## 2019-08-15 VITALS
SYSTOLIC BLOOD PRESSURE: 110 MMHG | OXYGEN SATURATION: 99 % | TEMPERATURE: 97 F | HEIGHT: 62 IN | WEIGHT: 147.5 LBS | DIASTOLIC BLOOD PRESSURE: 70 MMHG | BODY MASS INDEX: 27.14 KG/M2 | HEART RATE: 75 BPM | RESPIRATION RATE: 18 BRPM

## 2019-08-15 DIAGNOSIS — G89.29 CHRONIC LEFT-SIDED LOW BACK PAIN WITH LEFT-SIDED SCIATICA: Primary | ICD-10-CM

## 2019-08-15 DIAGNOSIS — Z78.9 NEED FOR FOLLOW-UP BY SOCIAL WORKER: ICD-10-CM

## 2019-08-15 DIAGNOSIS — M54.42 CHRONIC LEFT-SIDED LOW BACK PAIN WITH LEFT-SIDED SCIATICA: Primary | ICD-10-CM

## 2019-08-15 DIAGNOSIS — K21.9 GASTROESOPHAGEAL REFLUX DISEASE, ESOPHAGITIS PRESENCE NOT SPECIFIED: ICD-10-CM

## 2019-08-15 PROCEDURE — 99203 OFFICE O/P NEW LOW 30 MIN: CPT | Performed by: FAMILY MEDICINE

## 2019-08-15 RX ORDER — FAMOTIDINE 20 MG/1
20 TABLET, FILM COATED ORAL 2 TIMES DAILY
COMMUNITY
Start: 2019-05-12 | End: 2019-08-15 | Stop reason: ALTCHOICE

## 2019-08-15 RX ORDER — RANITIDINE 300 MG/1
300 CAPSULE ORAL EVERY 24 HOURS
Qty: 90 CAPSULE | Refills: 0 | Status: SHIPPED | OUTPATIENT
Start: 2019-08-15

## 2019-08-15 RX ORDER — GABAPENTIN 300 MG/1
CAPSULE ORAL
Qty: 90 CAPSULE | Refills: 2 | Status: SHIPPED | OUTPATIENT
Start: 2019-08-15 | End: 2019-08-16

## 2019-08-15 RX ORDER — DIAZEPAM 5 MG/1
5 TABLET ORAL EVERY 8 HOURS PRN
COMMUNITY
Start: 2018-12-13 | End: 2019-08-15 | Stop reason: ALTCHOICE

## 2019-08-15 RX ORDER — GABAPENTIN 100 MG/1
CAPSULE ORAL
Qty: 60 CAPSULE | Refills: 2 | Status: SHIPPED | OUTPATIENT
Start: 2019-08-15 | End: 2019-08-15

## 2019-08-15 RX ORDER — OXYCODONE HYDROCHLORIDE AND ACETAMINOPHEN 5; 325 MG/1; MG/1
TABLET ORAL
COMMUNITY
Start: 2018-06-01 | End: 2019-08-15 | Stop reason: ALTCHOICE

## 2019-08-15 RX ORDER — METHOCARBAMOL 500 MG/1
500 TABLET, FILM COATED ORAL 3 TIMES DAILY
Qty: 30 TABLET | Refills: 3 | Status: SHIPPED | OUTPATIENT
Start: 2019-08-15

## 2019-08-15 RX ORDER — PANTOPRAZOLE SODIUM 40 MG/1
40 TABLET, DELAYED RELEASE ORAL
Qty: 90 TABLET | Refills: 0 | Status: SHIPPED | OUTPATIENT
Start: 2019-08-15

## 2019-08-15 RX ORDER — MELOXICAM 7.5 MG/1
7.5 TABLET ORAL DAILY
Qty: 30 TABLET | Refills: 2 | Status: SHIPPED | OUTPATIENT
Start: 2019-08-15

## 2019-08-15 RX ORDER — SUCRALFATE ORAL 1 G/10ML
1 SUSPENSION ORAL 4 TIMES DAILY
Qty: 420 ML | Refills: 2 | Status: SHIPPED | OUTPATIENT
Start: 2019-08-15

## 2019-08-15 RX ORDER — ONDANSETRON 4 MG/1
4 TABLET, FILM COATED ORAL EVERY 8 HOURS PRN
COMMUNITY
Start: 2019-05-12 | End: 2019-08-15 | Stop reason: ALTCHOICE

## 2019-08-15 RX ORDER — NORGESTIMATE AND ETHINYL ESTRADIOL 7DAYSX3 28
1 KIT ORAL EVERY 24 HOURS
COMMUNITY
Start: 2018-04-24

## 2019-08-15 NOTE — PATIENT INSTRUCTIONS
- Increase gabapentin from 100 mg 3 times a day to 100 mg in morning and evening and 200 mg at bedtime for 2 days  Then 100 mg in morning and 200 mg in evening and bedtime for 2 days  Then 200 mg three times a day for 2 days  Then 200 mg morning and evening and 300 mg at bedtime for 5 days  Then 200 mg morning and 300 mg evening and bedtime for 5 days  Then 300 mg three times a day  -Take protonix 40 mg on empty stomach in morning  Take Zantac 300 mg in morning or bedtime  Use carafate 3 times a day with meals and at bedtime as needed for heart burn   - Take meloxicam once a day for the first week then as needed once a day for pain ( back or pelvic )  - Take robaxan first at bedtime  Then increase to two times a day as needed after 3 days and three times a day as needed after 3 days for back pain     - Avoid alcohol with medications  - Follow up with Gyn

## 2019-08-15 NOTE — PROGRESS NOTES
Assessment/Plan:    Chronic left-sided low back pain with left-sided sciatica  - Chronic LBP  No previous imaging of spine/ hip on record  CT abdomen and pelvis 6/2019 shows no acute fracture or destructive osseous lesion   - No red flag Sx  - Sensation and strength symmetric and intact B/L LE  - Knee jerk and ankle jerk 2 + B/L  - SLR negative   - TTP over SI joint suspicious for sacro-ilitis  - Reviewed job description with patient  - Patient has received opioids for pain  Percocet/ valium for back pain works and would like to know if she can get prescription today  PDMP queried to confirm prescriptions  - Percocet last prescribed by Dr Bobby Rebollar  Patient was referred to pain management per his last note and signed off chronic pain treatment 11/5/2018  - Explained to patient would adequately trial non opioid medication and PT for LBP prior to chronic opioid use  - Pt unwilling to go in for any imaging/ rheum w/u/ PT at this point given lack of insurance  Unwilling to talk to SW/ financial counselor today  Informed her that I will send a message to SW to see if we can help her out with obtaining MA   - Patient does not appear to be using naproxen appropriately and feels it is only for pelvic pain  Also has GERD  - Stop naproxen  Take meloxicam Qd  This would help with back pain/ SI and dysmenorrhea  Maximize management of GERD with protonix 40 mg Qam in addition to Zantac  Can use carafate 1g QACHS prn    - Can take tylenol  mg Q6- 8 hours as needed  - Robaxin 500 mg for back spasm  Start HS prn  Cautioned with sedation  May increase gradually as tolerated to TID prn  - Patient feels inadequate control of her radicular pain with gabapentin 100 mg TID  Would prefer increase of dosage as opposed to trial of Cymbalta  Instructions provided for gradual stepwise increase from 100 mg TID to 300 mg TID    - Patient would eventually require imaging   On f/u if no improvement and she is unable to go for imaging will reach out to radiology to review CT abd and pelvis to obtain further information about L spine and hip  Strongly encouraged to work with SW and financial counselor to address barrier to w/u and care in her best interest      Gastroesophageal reflux disease  - Protonix 40 mg Qam in addition to Zantac   - Carafate 1g q ACHS prn  - Limit caffeine intake  Avoid spicy food/ acidic food  - Elevate head end of bed by at least 6 inches  - Regular meal timings and at least  2- 3 hour gap between dinner and bed time    Follow up  - Patient C/O pelvic pain and dysmenorrhea and has upcoming appointment with Gyn  Prescribed NSAID at his visit  Diagnoses and all orders for this visit:    Chronic left-sided low back pain with left-sided sciatica  -     methocarbamol (ROBAXIN) 500 mg tablet; Take 1 tablet (500 mg total) by mouth 3 (three) times a day  -     meloxicam (MOBIC) 7 5 mg tablet; Take 1 tablet (7 5 mg total) by mouth daily  -     gabapentin (NEURONTIN) 300 mg capsule; Take 1 cap at bedtime initially  Then 2 times a day after 5 days  Then 3 times a day after 5 days    Gastroesophageal reflux disease, esophagitis presence not specified  -     pantoprazole (PROTONIX) 40 mg tablet; Take 1 tablet (40 mg total) by mouth daily before breakfast  -     sucralfate (CARAFATE) 1 g/10 mL suspension; Take 10 mL (1 g total) by mouth 4 (four) times a day  -     ranitidine (ZANTAC) 300 MG capsule; Take 1 capsule (300 mg total) by mouth every 24 hours    Need for follow-up by   -     Ambulatory referral to social work care management program; Future    Other orders  -     Discontinue: famotidine (PEPCID) 20 mg tablet; Take 20 mg by mouth 2 (two) times a day  -     norgestimate-ethinyl estradiol (ORTHO TRI-CYCLEN,TRINESSA) 0 18/0 215/0 25 MG-35 MCG per tablet; 1 tablet every 24 hours  -     Discontinue: ondansetron (ZOFRAN) 4 mg tablet;  Take 4 mg by mouth every 8 (eight) hours as needed  -     Discontinue: diazepam (VALIUM) 5 mg tablet; Take 5 mg by mouth every 8 (eight) hours as needed  -     Discontinue: oxyCODONE-acetaminophen (PERCOCET) 5-325 mg per tablet; take 1 tablet by oral route  every 6 hrs as needed for day if severe pain  -     Discontinue: gabapentin (NEURONTIN) 100 mg capsule; Take 1 cap at bedtime initially  Then 2 capsules a day after 5 days  Then 3 capsules a day  -     Discontinue: gabapentin (NEURONTIN) 300 mg capsule; Take 1 cap at bedtime initially  Then 2 capsules a day after 5 days  Then 3 capsules a day  Subjective:      Patient ID: Farhad Longo is a 35 y o  female  35 y o female presents for initial visit to establish care  History obtained with interpretor ID [id number]    C/O back pain, pelvic pain and dysmenorrhea  Was previously seeing Dr Kena Becker and switching practice due to insurance concerns  She has been paying for services, no insurance  Has been in the [place] for 15 years  From [place]  PMH notable for low back pain x 3 years  Pain over mid back and left side  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 in same position for many hours  Gradual onset and worsening of her back pain  Pain radiates to LLE at this visit  In past was noted to have pain radiation to right per notes from previous PCP  Also C/O numbness and tingling over L foot  She is working at present - construction, cleaning  No bladder/ bowel incontinence  No falls  No X- rays or scans of back due to affordability concerns  For her back pain has received prescriptions for percocet and valium  Last percocet prescription from Dr Kena Becker 11/2018  Received valium in ER visit 12/2018  States they help with pain  Also started on gabapentin 100 mg TID for radicular symptoms  Feels this dose is not adequate enough  Med rec noted for naproxen, states he was started on it for pelvic pain and not for her back and does not think this will help with her back       C/O pelvic pain and dysmenorrhea  Has upcoming appointment with Gyn  The following portions of the patient's history were reviewed and updated as appropriate: She  has a past medical history of Chronic back pain, Depression, and Gastritis  Patient Active Problem List    Diagnosis Date Noted    Gastroesophageal reflux disease 08/16/2019    Irritation of vulva 09/10/2018    Dental abscess 07/19/2018    Radiculopathy of lumbosacral region 06/27/2018    Dysmenorrhea, unspecified 06/27/2018    Chronic left-sided low back pain with left-sided sciatica 04/06/2017     She  has no past surgical history on file  Her family history includes Hypertension in her brother and family; Stroke in her mother  She  reports that she has never smoked  She has never used smokeless tobacco  She reports that she drinks alcohol  She reports that she does not use drugs  Current Outpatient Medications   Medication Sig Dispense Refill    oxyCODONE-acetaminophen (PERCOCET) 5-325 mg per tablet Take 1 tablet by mouth every 6 (six) hours as needed for moderate pain (as needed for pain) Max Daily Amount: 4 tablets 120 tablet 0    gabapentin (NEURONTIN) 300 mg capsule Take 1 cap at bedtime initially  Then 2 times a day after 5 days   Then 3 times a day after 5 days 90 capsule 2    meloxicam (MOBIC) 7 5 mg tablet Take 1 tablet (7 5 mg total) by mouth daily 30 tablet 2    methocarbamol (ROBAXIN) 500 mg tablet Take 1 tablet (500 mg total) by mouth 3 (three) times a day 30 tablet 3    norgestimate-ethinyl estradiol (ORTHO TRI-CYCLEN,TRINESSA) 0 18/0 215/0 25 MG-35 MCG per tablet 1 tablet every 24 hours      pantoprazole (PROTONIX) 40 mg tablet Take 1 tablet (40 mg total) by mouth daily before breakfast 90 tablet 0    ranitidine (ZANTAC) 300 MG capsule Take 1 capsule (300 mg total) by mouth every 24 hours 90 capsule 0    sucralfate (CARAFATE) 1 g/10 mL suspension Take 10 mL (1 g total) by mouth 4 (four) times a day 420 mL 2     No current facility-administered medications for this visit  Current Outpatient Medications on File Prior to Visit   Medication Sig    oxyCODONE-acetaminophen (PERCOCET) 5-325 mg per tablet Take 1 tablet by mouth every 6 (six) hours as needed for moderate pain (as needed for pain) Max Daily Amount: 4 tablets    norgestimate-ethinyl estradiol (ORTHO TRI-CYCLEN,TRINESSA) 0 18/0 215/0 25 MG-35 MCG per tablet 1 tablet every 24 hours     No current facility-administered medications on file prior to visit  She is allergic to no active allergies       Review of Systems   Constitutional: Negative for chills and fever  HENT: Negative for congestion, sore throat and trouble swallowing  Respiratory: Negative for cough, shortness of breath and wheezing  Cardiovascular: Negative for chest pain, palpitations and leg swelling  Gastrointestinal: Positive for abdominal pain  Negative for blood in stool, constipation, diarrhea, nausea and vomiting  Genitourinary: Positive for pelvic pain  Negative for dysuria and hematuria  Musculoskeletal: Positive for back pain  Negative for gait problem  Neurological: Negative for seizures and weakness  Objective:      /70 (BP Location: Left arm, Patient Position: Sitting, Cuff Size: Standard)   Pulse 75   Temp (!) 97 °F (36 1 °C) (Temporal)   Resp 18   Ht 5' 2" (1 575 m)   Wt 66 9 kg (147 lb 8 oz)   SpO2 99%   BMI 26 98 kg/m²          Physical Exam   Constitutional: She is oriented to person, place, and time  She appears well-developed and well-nourished  No distress  HENT:   Head: Normocephalic and atraumatic  Mouth/Throat: Oropharynx is clear and moist  No oropharyngeal exudate  Eyes: Pupils are equal, round, and reactive to light  Conjunctivae are normal  Right eye exhibits no discharge  Left eye exhibits no discharge  Neck: Normal range of motion  Cardiovascular: Normal rate, regular rhythm and normal heart sounds   Exam reveals no gallop and no friction rub  No murmur heard  Pulmonary/Chest: Effort normal  No stridor  No respiratory distress  She has no wheezes  She has no rales  Abdominal: Soft  She exhibits no distension  There is no tenderness  There is no rebound and no guarding  Musculoskeletal:        Lumbar back: She exhibits decreased range of motion (Active flexion restricted by pain), tenderness and pain  Back:    SLR supine negative  C/O tightness in hamstring over L side but no back pain or intensification of pain on dorsiflexion of foot    Lymphadenopathy:     She has no cervical adenopathy  Neurological: She is alert and oriented to person, place, and time  She has normal strength  No sensory deficit  Reflex Scores:       Patellar reflexes are 2+ on the right side and 2+ on the left side  Achilles reflexes are 2+ on the right side and 2+ on the left side  Skin: Skin is warm  She is not diaphoretic  Vitals reviewed

## 2019-08-16 PROBLEM — K21.9 GASTROESOPHAGEAL REFLUX DISEASE: Status: ACTIVE | Noted: 2019-08-16

## 2019-08-16 PROBLEM — G89.29 CHRONIC BILATERAL LOW BACK PAIN: Status: ACTIVE | Noted: 2017-04-06

## 2019-08-16 PROBLEM — M54.50 CHRONIC BILATERAL LOW BACK PAIN: Status: ACTIVE | Noted: 2017-04-06

## 2019-08-16 PROBLEM — M54.42 CHRONIC LEFT-SIDED LOW BACK PAIN WITH LEFT-SIDED SCIATICA: Status: ACTIVE | Noted: 2017-04-06

## 2019-08-16 RX ORDER — GABAPENTIN 300 MG/1
CAPSULE ORAL
Qty: 90 CAPSULE | Refills: 2 | Status: SHIPPED | OUTPATIENT
Start: 2019-08-16

## 2019-08-16 NOTE — ASSESSMENT & PLAN NOTE
- Chronic LBP  No previous imaging of spine/ hip on record  CT abdomen and pelvis 6/2019 shows no acute fracture or destructive osseous lesion   - No red flag Sx  - Sensation and strength symmetric and intact B/L LE  - Knee jerk and ankle jerk 2 + B/L  - SLR negative   - TTP over SI joint suspicious for sacro-ilitis  - Reviewed job description with patient  - Patient has received opioids for pain  Percocet/ valium for back pain works and would like to know if she can get prescription today  PDMP queried to confirm prescriptions  - Percocet last prescribed by Dr Salma Harris  Patient was referred to pain management per his last note and signed off chronic pain treatment 11/5/2018  - Explained to patient would adequately trial non opioid medication and PT for LBP prior to chronic opioid use  - Pt unwilling to go in for any imaging/ rheum w/u/ PT at this point given lack of insurance  Unwilling to talk to SW/ financial counselor today  Informed her that I will send a message to SW to see if we can help her out with obtaining MA   - Patient does not appear to be using naproxen appropriately and feels it is only for pelvic pain  Also has GERD  - Stop naproxen  Take meloxicam Qd  This would help with back pain/ SI and dysmenorrhea  Maximize management of GERD with protonix 40 mg Qam in addition to Zantac  Can use carafate 1g QACHS prn    - Can take tylenol  mg Q6- 8 hours as needed  - Robaxin 500 mg for back spasm  Start HS prn  Cautioned with sedation  May increase gradually as tolerated to TID prn  - Patient feels inadequate control of her radicular pain with gabapentin 100 mg TID  Would prefer increase of dosage as opposed to trial of Cymbalta  Instructions provided for gradual stepwise increase from 100 mg TID to 300 mg TID    - Patient would eventually require imaging   On f/u if no improvement and she is unable to go for imaging will reach out to radiology to review CT abd and pelvis to obtain further information about L spine and hip   Strongly encouraged to work with SW and financial counselor to address barrier to w/u and care in her best interest

## 2019-08-16 NOTE — ASSESSMENT & PLAN NOTE
- Protonix 40 mg Qam in addition to Zantac   - Carafate 1g q ACHS prn  - Limit caffeine intake   Avoid spicy food/ acidic food  - Elevate head end of bed by at least 6 inches  - Regular meal timings and at least  2- 3 hour gap between dinner and bed time

## 2019-09-04 ENCOUNTER — PATIENT OUTREACH (OUTPATIENT)
Dept: FAMILY MEDICINE CLINIC | Facility: CLINIC | Age: 39
End: 2019-09-04

## 2019-09-04 NOTE — PROGRESS NOTES
TANIA KUO referral from Dr Meli Wolff  Pt is currently uninsured and has met with the Financial Counselor and been placed on SFS  TANIA KUO spoke with Loreta Machado, [facility] Supervisor, who advised that if the [facility], pt will pay whatever the policy for [employer] states  At this point, the policy states that uninsured patients will pay $30 up front and they will receive a 50% discounted bill for the balance  With regard to labs, if the test is not covered under [facility] SFS, and the pt is referred to an outside lab, the patient is responsible for this bill  If the pt would like to use [facility] lab, the pt has the option to go to the nearest hospital and request a  at registration  If the patient cannot afford the [facility], the patient will have to request to be billed for services  The patient cannot apply for ehsan care under [facility] unless they already have a bill  [facility] Financial Counselors are able to provide patients the contact information for hospital financial counselors  TANIA KUO left a voicemail for pt with this information utilizing [employer]  #[phone]  Pt was encouraged to call TANIA KUO back if she has any additional questions  No other psychosocial concerns at this time  TANIA KUO is closing referral, but remains available for additional support as needed via order

## 2019-10-21 ENCOUNTER — HOSPITAL ENCOUNTER (EMERGENCY)
Facility: HOSPITAL | Age: 39
Discharge: HOME | End: 2019-10-21
Attending: EMERGENCY MEDICINE | Admitting: EMERGENCY MEDICINE
Payer: COMMERCIAL

## 2019-10-21 ENCOUNTER — APPOINTMENT (EMERGENCY)
Dept: CT IMAGING | Facility: HOSPITAL | Age: 39
End: 2019-10-21
Payer: COMMERCIAL

## 2019-10-21 VITALS
TEMPERATURE: 97.5 F | WEIGHT: 145.5 LBS | DIASTOLIC BLOOD PRESSURE: 73 MMHG | SYSTOLIC BLOOD PRESSURE: 146 MMHG | OXYGEN SATURATION: 99 % | HEART RATE: 62 BPM | RESPIRATION RATE: 16 BRPM

## 2019-10-21 DIAGNOSIS — R11.2 NAUSEA AND VOMITING: ICD-10-CM

## 2019-10-21 DIAGNOSIS — R10.9 ABDOMINAL PAIN: Primary | ICD-10-CM

## 2019-10-21 DIAGNOSIS — D21.9 FIBROIDS: ICD-10-CM

## 2019-10-21 LAB
ALBUMIN SERPL BCP-MCNC: 3.6 G/DL (ref 3.5–5)
ALP SERPL-CCNC: 110 U/L (ref 46–116)
ALT SERPL W P-5'-P-CCNC: 23 U/L (ref 12–78)
ANION GAP SERPL CALCULATED.3IONS-SCNC: 8 MMOL/L (ref 4–13)
AST SERPL W P-5'-P-CCNC: 17 U/L (ref 5–45)
BACTERIA UR QL AUTO: ABNORMAL /HPF
BASOPHILS # BLD AUTO: 0.05 THOUSANDS/ΜL (ref 0–0.1)
BASOPHILS NFR BLD AUTO: 1 % (ref 0–1)
BILIRUB SERPL-MCNC: 0.14 MG/DL (ref 0.2–1)
BILIRUB UR QL STRIP: NEGATIVE
BUN SERPL-MCNC: 15 MG/DL (ref 5–25)
CALCIUM SERPL-MCNC: 9 MG/DL (ref 8.3–10.1)
CHLORIDE SERPL-SCNC: 105 MMOL/L (ref 100–108)
CLARITY UR: CLEAR
CO2 SERPL-SCNC: 27 MMOL/L (ref 21–32)
COLOR UR: YELLOW
COLOR, POC: YELLOW
CREAT SERPL-MCNC: 0.77 MG/DL (ref 0.6–1.3)
EOSINOPHIL # BLD AUTO: 0.18 THOUSAND/ΜL (ref 0–0.61)
EOSINOPHIL NFR BLD AUTO: 3 % (ref 0–6)
ERYTHROCYTE [DISTWIDTH] IN BLOOD BY AUTOMATED COUNT: 12.4 % (ref 11.6–15.1)
EXT PREG TEST URINE: NEGATIVE
EXT. CONTROL ED NAV: NORMAL
GFR SERPL CREATININE-BSD FRML MDRD: 97 ML/MIN/1.73SQ M
GLUCOSE SERPL-MCNC: 98 MG/DL (ref 65–140)
GLUCOSE UR STRIP-MCNC: NEGATIVE MG/DL
HCT VFR BLD AUTO: 40.3 % (ref 34.8–46.1)
HGB BLD-MCNC: 12.9 G/DL (ref 11.5–15.4)
HGB UR QL STRIP.AUTO: NEGATIVE
IMM GRANULOCYTES # BLD AUTO: 0.02 THOUSAND/UL (ref 0–0.2)
IMM GRANULOCYTES NFR BLD AUTO: 0 % (ref 0–2)
KETONES UR STRIP-MCNC: NEGATIVE MG/DL
LEUKOCYTE ESTERASE UR QL STRIP: ABNORMAL
LIPASE SERPL-CCNC: 252 U/L (ref 73–393)
LYMPHOCYTES # BLD AUTO: 2.26 THOUSANDS/ΜL (ref 0.6–4.47)
LYMPHOCYTES NFR BLD AUTO: 31 % (ref 14–44)
MCH RBC QN AUTO: 30.2 PG (ref 26.8–34.3)
MCHC RBC AUTO-ENTMCNC: 32 G/DL (ref 31.4–37.4)
MCV RBC AUTO: 94 FL (ref 82–98)
MONOCYTES # BLD AUTO: 0.57 THOUSAND/ΜL (ref 0.17–1.22)
MONOCYTES NFR BLD AUTO: 8 % (ref 4–12)
NEUTROPHILS # BLD AUTO: 4.14 THOUSANDS/ΜL (ref 1.85–7.62)
NEUTS SEG NFR BLD AUTO: 57 % (ref 43–75)
NITRITE UR QL STRIP: NEGATIVE
NON-SQ EPI CELLS URNS QL MICRO: ABNORMAL /HPF
NRBC BLD AUTO-RTO: 0 /100 WBCS
PH UR STRIP.AUTO: 5.5 [PH] (ref 4.5–8)
PLATELET # BLD AUTO: 259 THOUSANDS/UL (ref 149–390)
PMV BLD AUTO: 10.2 FL (ref 8.9–12.7)
POTASSIUM SERPL-SCNC: 4.1 MMOL/L (ref 3.5–5.3)
PROT SERPL-MCNC: 7 G/DL (ref 6.4–8.2)
PROT UR STRIP-MCNC: NEGATIVE MG/DL
RBC # BLD AUTO: 4.27 MILLION/UL (ref 3.81–5.12)
RBC #/AREA URNS AUTO: ABNORMAL /HPF
SODIUM SERPL-SCNC: 140 MMOL/L (ref 136–145)
SP GR UR STRIP.AUTO: 1.01 (ref 1–1.03)
UROBILINOGEN UR QL STRIP.AUTO: 0.2 E.U./DL
WBC # BLD AUTO: 7.22 THOUSAND/UL (ref 4.31–10.16)
WBC #/AREA URNS AUTO: ABNORMAL /HPF

## 2019-10-21 PROCEDURE — 96374 THER/PROPH/DIAG INJ IV PUSH: CPT

## 2019-10-21 PROCEDURE — 74177 CT ABD & PELVIS W/CONTRAST: CPT

## 2019-10-21 PROCEDURE — 81001 URINALYSIS AUTO W/SCOPE: CPT

## 2019-10-21 PROCEDURE — 96361 HYDRATE IV INFUSION ADD-ON: CPT

## 2019-10-21 PROCEDURE — 36415 COLL VENOUS BLD VENIPUNCTURE: CPT | Performed by: EMERGENCY MEDICINE

## 2019-10-21 PROCEDURE — 96375 TX/PRO/DX INJ NEW DRUG ADDON: CPT

## 2019-10-21 PROCEDURE — 99284 EMERGENCY DEPT VISIT MOD MDM: CPT

## 2019-10-21 PROCEDURE — 80053 COMPREHEN METABOLIC PANEL: CPT | Performed by: EMERGENCY MEDICINE

## 2019-10-21 PROCEDURE — 81025 URINE PREGNANCY TEST: CPT | Performed by: EMERGENCY MEDICINE

## 2019-10-21 PROCEDURE — 83690 ASSAY OF LIPASE: CPT | Performed by: EMERGENCY MEDICINE

## 2019-10-21 PROCEDURE — 99284 EMERGENCY DEPT VISIT MOD MDM: CPT | Performed by: EMERGENCY MEDICINE

## 2019-10-21 PROCEDURE — 85025 COMPLETE CBC W/AUTO DIFF WBC: CPT | Performed by: EMERGENCY MEDICINE

## 2019-10-21 RX ORDER — FAMOTIDINE 20 MG/1
20 TABLET, FILM COATED ORAL 2 TIMES DAILY
Qty: 30 TABLET | Refills: 0 | Status: SHIPPED | OUTPATIENT
Start: 2019-10-21

## 2019-10-21 RX ORDER — ONDANSETRON 2 MG/ML
4 INJECTION INTRAMUSCULAR; INTRAVENOUS ONCE
Status: COMPLETED | OUTPATIENT
Start: 2019-10-21 | End: 2019-10-21

## 2019-10-21 RX ORDER — METOCLOPRAMIDE 10 MG/1
10 TABLET ORAL EVERY 6 HOURS
Qty: 10 TABLET | Refills: 0 | Status: SHIPPED | OUTPATIENT
Start: 2019-10-21

## 2019-10-21 RX ORDER — KETOROLAC TROMETHAMINE 30 MG/ML
15 INJECTION, SOLUTION INTRAMUSCULAR; INTRAVENOUS ONCE
Status: COMPLETED | OUTPATIENT
Start: 2019-10-21 | End: 2019-10-21

## 2019-10-21 RX ORDER — SUCRALFATE 1 G/1
1 TABLET ORAL 4 TIMES DAILY
Qty: 10 TABLET | Refills: 0 | Status: SHIPPED | OUTPATIENT
Start: 2019-10-21

## 2019-10-21 RX ADMIN — SODIUM CHLORIDE 1000 ML: 0.9 INJECTION, SOLUTION INTRAVENOUS at 07:06

## 2019-10-21 RX ADMIN — IOHEXOL 100 ML: 350 INJECTION, SOLUTION INTRAVENOUS at 08:00

## 2019-10-21 RX ADMIN — ONDANSETRON 4 MG: 2 INJECTION INTRAMUSCULAR; INTRAVENOUS at 07:06

## 2019-10-21 RX ADMIN — KETOROLAC TROMETHAMINE 15 MG: 30 INJECTION, SOLUTION INTRAMUSCULAR at 07:09

## 2019-10-21 NOTE — ED NOTES
Patient transported [facility] [address] [place] [address] [facility]  10/21/19 [id number]

## 2019-10-21 NOTE — ED PROVIDER NOTES
History  Chief Complaint   Patient presents with    Abdominal Pain     pain "for a really long time" reports epigastric pain that is inhibiting her from eating and drinking  History provided by:  Patient   used: No    Abdominal Pain   Pain location:  Epigastric  Pain quality: bloating    Duration:  4 weeks  Chronicity:  New  Context: not previous surgeries    Worsened by:  Eating  Associated symptoms: nausea and vomiting    Associated symptoms: no chills, no constipation, no cough, no diarrhea, no dysuria, no fever, no hematuria, no vaginal bleeding and no vaginal discharge        None       History reviewed  No pertinent past medical history  History reviewed  No pertinent surgical history  History reviewed  No pertinent family history  I have reviewed and agree with the history as documented  Social History     Tobacco Use    Smoking status: Never Smoker    Smokeless tobacco: Never Used   Substance Use Topics    Alcohol use: Not Currently     Frequency: Never    Drug use: Not Currently        Review of Systems   Constitutional: Negative for appetite change, chills and fever  HENT: Negative for congestion and rhinorrhea  Respiratory: Negative for cough  Gastrointestinal: Positive for abdominal pain, nausea and vomiting  Negative for abdominal distention, blood in stool, constipation and diarrhea  Genitourinary: Negative for dysuria, flank pain, frequency, hematuria, urgency, vaginal bleeding and vaginal discharge  Musculoskeletal: Negative for back pain  All other systems reviewed and are negative  Physical Exam  Physical Exam   Constitutional: She is oriented to person, place, and time  She appears well-developed and well-nourished  No distress  HENT:   Head: Normocephalic  Mouth/Throat: Oropharynx is clear and moist and mucous membranes are normal    Neck: Normal range of motion  Neck supple     Cardiovascular: Normal rate, regular rhythm, normal heart sounds and intact distal pulses  Exam reveals no gallop and no friction rub  No murmur heard  Pulmonary/Chest: Effort normal and breath sounds normal  No respiratory distress  She has no wheezes  She has no rales  Abdominal: Soft  Normal appearance and bowel sounds are normal  She exhibits no distension and no mass  There is no hepatosplenomegaly  There is tenderness in the epigastric area, left upper quadrant and left lower quadrant  There is no rigidity, no rebound, no guarding, no CVA tenderness, no tenderness at McBurney's point and negative Clark's sign  Lymphadenopathy:     She has no cervical adenopathy  Neurological: She is alert and oriented to person, place, and time  Skin: Skin is warm, dry and intact  No rash noted  No pallor  Nursing note and vitals reviewed        Vital Signs  ED Triage Vitals [10/21/19 0637]   Temperature Pulse Respirations Blood Pressure SpO2   97 5 °F (36 4 °C) 81 18 114/61 100 %      Temp Source Heart Rate Source Patient Position - Orthostatic VS BP Location FiO2 (%)   Temporal Monitor Sitting Right arm --      Pain Score       9           Vitals:    10/21/19 0637 10/21/19 0758   BP: 114/61 146/73   Pulse: 81 62   Patient Position - Orthostatic VS: Sitting Lying         Visual Acuity      ED Medications  Medications   sodium chloride 0 9 % bolus 1,000 mL (0 mL Intravenous Stopped 10/21/19 0837)   ketorolac (TORADOL) injection 15 mg (15 mg Intravenous Given 10/21/19 0709)   ondansetron (ZOFRAN) injection 4 mg (4 mg Intravenous Given 10/21/19 0706)   iohexol (OMNIPAQUE) 350 MG/ML injection (MULTI-DOSE) 100 mL (100 mL Intravenous Given 10/21/19 0800)       Diagnostic Studies  Results Reviewed     Procedure Component Value Units Date/Time    Comprehensive metabolic panel [[id number]]  (Abnormal) Collected:  10/21/19 0705    Lab Status:  Final result Specimen:  Blood from Arm, Left Updated:  10/21/19 0734     Sodium 140 mmol/L      Potassium 4 1 mmol/L      Chloride 105 mmol/L      CO2 27 mmol/L      ANION GAP 8 mmol/L      BUN 15 mg/dL      Creatinine 0 77 mg/dL      Glucose 98 mg/dL      Calcium 9 0 mg/dL      AST 17 U/L      ALT 23 U/L      Alkaline Phosphatase 110 U/L      Total Protein 7 0 g/dL      Albumin 3 6 g/dL      Total Bilirubin 0 14 mg/dL      eGFR 97 ml/min/1 73sq m     Narrative:       National Kidney Disease Foundation guidelines for Chronic Kidney Disease (CKD):     Stage 1 with normal or high GFR (GFR > 90 mL/min/1 73 square meters)    Stage 2 Mild CKD (GFR = 60-89 mL/min/1 73 square meters)    Stage 3A Moderate CKD (GFR = 45-59 mL/min/1 73 square meters)    Stage 3B Moderate CKD (GFR = 30-44 mL/min/1 73 square meters)    Stage 4 Severe CKD (GFR = 15-29 mL/min/1 73 square meters)    Stage 5 End Stage CKD (GFR <15 mL/min/1 73 square meters)  Note: GFR calculation is accurate only with a steady state creatinine    Lipase [[id number]]  (Normal) Collected:  10/21/19 0705    Lab Status:  Final result Specimen:  Blood from Arm, Left Updated:  10/21/19 0734     Lipase 252 u/L     Urine Microscopic [[id number]]  (Abnormal) Collected:  10/21/19 0657    Lab Status:  Final result Specimen:  Urine, Clean Catch Updated:  10/21/19 0724     RBC, UA None Seen /hpf      WBC, UA 2-4 /hpf      Epithelial Cells Occasional /hpf      Bacteria, UA None Seen /hpf     CBC and differential [[id number]] Collected:  10/21/19 0705    Lab Status:  Final result Specimen:  Blood from Arm, Left Updated:  10/21/19 0719     WBC 7 22 Thousand/uL      RBC 4 27 Million/uL      Hemoglobin 12 9 g/dL      Hematocrit 40 3 %      MCV 94 fL      MCH 30 2 pg      MCHC 32 0 g/dL      RDW 12 4 %      MPV 10 2 fL      Platelets 546 Thousands/uL      nRBC 0 /100 WBCs      Neutrophils Relative 57 %      Immat GRANS % 0 %      Lymphocytes Relative 31 %      Monocytes Relative 8 %      Eosinophils Relative 3 %      Basophils Relative 1 %      Neutrophils Absolute 4 14 Thousands/µL      Immature Grans Absolute 0 02 Thousand/uL      Lymphocytes Absolute 2 26 Thousands/µL      Monocytes Absolute 0 57 Thousand/µL      Eosinophils Absolute 0 18 Thousand/µL      Basophils Absolute 0 05 Thousands/µL     POCT pregnancy, urine [[id number]]  (Normal) Resulted:  10/21/19 0658    Lab Status:  Final result Updated:  10/21/19 0705     EXT PREG TEST UR (Ref: Negative) Negative     Control Valid    POCT urinalysis dipstick [[id number]]  (Normal) Resulted:  10/21/19 0704    Lab Status:  Final result Specimen:  Urine Updated:  10/21/19 0705     Color, UA Yellow    ED Urine Macroscopic [[id number]]  (Abnormal) Collected:  10/21/19 0657    Lab Status:  Final result Specimen:  Urine Updated:  10/21/19 0658     Color, UA Yellow     Clarity, UA Clear     pH, UA 5 5     Leukocytes, UA Trace     Nitrite, UA Negative     Protein, UA Negative mg/dl      Glucose, UA Negative mg/dl      Ketones, UA Negative mg/dl      Urobilinogen, UA 0 2 E U /dl      Bilirubin, UA Negative     Blood, UA Negative     Specific Gravity, UA 1 010    Narrative:       CLINITEK RESULT                 CT abdomen pelvis with contrast   Final Result by Mihir West MD (10/21 2206)      No acute pathology  Findings suggestive of uterine fibroids  Consider follow-up nonemergent ultrasound for further evaluation  Workstation performed: [id number]                    Procedures  Procedures       ED Course  ED Course as of Oct 21 0859   Mon Oct 21, 2019   9989 Discussed results with pt with help of family translating  Instructed pt to f/u with GI for ongoing abdominal pain and to f/u with OBGYN for ultrasound to assess the uterine fibroids seen on CT scan                                    MDM  Number of Diagnoses or Management Options     Amount and/or Complexity of Data Reviewed  Clinical lab tests: ordered and reviewed  Tests in the radiology section of CPT®: ordered and reviewed  Tests in the medicine section of CPT®: reviewed and ordered        Disposition  Final diagnoses:   Abdominal pain   Nausea and vomiting   Fibroids - uterine     Time reflects when diagnosis was documented in both MDM as applicable and the Disposition within this note     Time User Action Codes Description Comment    10/21/2019  8:21 AM Janessa Hay Add [R10 9] Abdominal pain     10/21/2019  8:22 AM Janessa Hay Add [R11 2] Nausea and vomiting     10/21/2019  8:22 AM Janessa Hay Add [D21 9] Fibroids     10/21/2019  8:22 AM [place], [employer] [facility]	[place] Modify [D21 9] Fibroids uterine      ED Disposition     ED Disposition Condition Date/Time Comment    Discharge Stable Mon Oct 21, 2019  8:22 AM [place] Finder discharge to home/self care              Follow-up Information     Follow up With Specialties Details Why Contact Info Additional [facility]h [facility] Gastroenterology Schedule an appointment as soon as possible for a visit  For your abdominal pain [address]  [place] [address] [facility] [id number]  Michaela Hendrickson [address] [facility], [address], [address] [facility], [place], [place], [address]   [facility] cos  Obstetrics and Gynecology Schedule an appointment as soon as possible for a visit  For ultrasound to look at the uterine fibroids [facility] [id number]  [address] [facility], [address] [facility]          Discharge Medication List as of 10/21/2019  8:24 AM      START taking these medications    Details   famotidine (PEPCID) 20 mg tablet Take 1 tablet (20 mg total) by mouth 2 (two) times a day, Starting Mon 10/21/2019, Print      metoclopramide (REGLAN) 10 mg tablet Take 1 tablet (10 mg total) by mouth every 6 (six) hours, Starting Mon 10/21/2019, Print      sucralfate (CARAFATE) 1 g tablet Take 1 tablet (1 g total) by mouth 4 (four) times a day, Starting Mon 10/21/2019, Print           No discharge procedures on file      ED Provider  Electronically Signed by           Emeli Damon 24, DO  10/21/19 8904

## 2019-10-22 ENCOUNTER — TELEPHONE (OUTPATIENT)
Dept: GASTROENTEROLOGY | Facility: AMBULARY SURGERY CENTER | Age: 39
End: 2019-10-22

## 2019-10-22 NOTE — TELEPHONE ENCOUNTER
Patients GI provider:  NEW PT    Number to return call: [phone]    Reason for call: Clinic pt called requesting to schedule a hosp fu for egd  Norwegian speaker  Scheduled procedure/appointment date if applicable: Apt/procedure   n/a

## 2019-10-31 ENCOUNTER — OFFICE VISIT (OUTPATIENT)
Dept: OBGYN CLINIC | Facility: CLINIC | Age: 39
End: 2019-10-31

## 2019-10-31 VITALS
HEIGHT: 63 IN | HEART RATE: 71 BPM | DIASTOLIC BLOOD PRESSURE: 76 MMHG | BODY MASS INDEX: 25.34 KG/M2 | WEIGHT: 143 LBS | SYSTOLIC BLOOD PRESSURE: 139 MMHG

## 2019-10-31 DIAGNOSIS — R10.2 PELVIC PAIN: Primary | ICD-10-CM

## 2019-10-31 PROCEDURE — 99213 OFFICE O/P EST LOW 20 MIN: CPT | Performed by: STUDENT IN AN ORGANIZED HEALTH CARE EDUCATION/TRAINING PROGRAM

## 2019-11-01 ENCOUNTER — TRANSCRIBE ORDERS (OUTPATIENT)
Dept: LAB | Facility: CLINIC | Age: 39
End: 2019-11-01

## 2019-11-01 ENCOUNTER — APPOINTMENT (OUTPATIENT)
Dept: LAB | Facility: CLINIC | Age: 39
End: 2019-11-01

## 2019-11-01 DIAGNOSIS — R10.2 PELVIC PAIN: ICD-10-CM

## 2019-11-01 LAB
EST. AVERAGE GLUCOSE BLD GHB EST-MCNC: 128 MG/DL
HBA1C MFR BLD: 6.1 % (ref 4.2–6.3)
TSH SERPL DL<=0.05 MIU/L-ACNC: 1.36 UIU/ML (ref 0.36–3.74)

## 2019-11-01 PROCEDURE — 83036 HEMOGLOBIN GLYCOSYLATED A1C: CPT

## 2019-11-01 PROCEDURE — 84443 ASSAY THYROID STIM HORMONE: CPT

## 2019-11-01 PROCEDURE — 36415 COLL VENOUS BLD VENIPUNCTURE: CPT

## 2019-11-01 NOTE — PROGRESS NOTES
Assessment:  35 y o   who presents as a follow up from ER visit on 10/21 for a complaint of pelvic pain  Plan:  Discussed with patient that there are three possible body system that could be causing her pain  Considering that there may be fibroids on the CT scan, will order a TVUS to help characterize the uterus and ovaries better  Will also order a TSH to help evaluate her irregular menses  Since patient has frequent urination with a negative UA In the ED, will also order a hba1c to check for possible diabetes  Discussed with patient that since she has not had an annual, will have her follow up in two weeks to review results and complete her pap smear  Patient also has an appointment with GI to assess bowel movement and pain associated with it  In the meantime, advise motrin and tylenol to help with cramping  RTO in two weeks for annual/results  Omaira Eduardo  10/31/19          __________________________________________________________________    Subjective   Jeffefrain Stanton is a 35 y o  G0 who presents as a follow up from the ER on 10/21 for a complaint of abdominal pain x 1 month  Patient reports having left sided pain that is in the lower and upper left quadrant  Patient reports this pain has been going on for a month  Patient also states she has monthly menses that last only for a day, but when she does have it, it is extremely painful  Patient has not seen a OB/GYN in years and cannot remember if she had abnormal pap smear  Patient reports having a history of an ovarian cyst when she went to the ED and was given naproxen, but did not follow up with OB/GYN due to insurance issues  Patient reports when she has a bowel movement, it is very painful  Patient also reports having frequent urination  Patient otherwise denies any past medical history or surgical history  Patient denies any fever/chills, nausea/vomiting currently       The following portions of the patient's history were reviewed and updated as appropriate: allergies, current medications, past family history, past medical history, past social history, past surgical history and problem list     Review of Systems  Pertinent items are noted in HPI  Objective  Vitals:    10/31/19 1510   BP: 139/76   Pulse: 71       Physical Exam:  General:   alert and oriented, in no acute distress   Abdomen: soft, nondistended, normal bowel sounds, tenderness mild in the LUQ and in the LLQ, without guarding and without rebound   Vulva: normal   Vagina: normal mucosa, normal discharge   Cervix: no cervical motion tenderness, no lesions and nulliparous appearance   Uterus: normal shape and consistency   Adnexa: no mass, fullness, tenderness       Lab Review  Labs: Urine pregnancy test negative    Imaging  CT ABDOMEN AND PELVIS WITH IV CONTRAST     INDICATION:   generalized abd pain x 1 month      COMPARISON:  None      TECHNIQUE:  CT examination of the abdomen and pelvis was performed  Axial, sagittal, and coronal 2D reformatted images were created from the source data and submitted for interpretation      Radiation dose length product (DLP) for this visit:  324 mGy-cm   This examination, like all CT scans performed in the [facility], was performed utilizing techniques to minimize radiation dose exposure, including the use of iterative   reconstruction and automated exposure control      IV Contrast:  100 mL of iohexol (OMNIPAQUE)  Enteric Contrast:  Enteric contrast was not administered      FINDINGS:     ABDOMEN     LOWER CHEST:  No clinically significant abnormality identified in the visualized lower chest      LIVER/BILIARY TREE:  Unremarkable      GALLBLADDER:  No calcified gallstones  No pericholecystic inflammatory change      SPLEEN:  Unremarkable      PANCREAS:  Unremarkable      ADRENAL GLANDS:  Unremarkable      KIDNEYS/URETERS:  Unremarkable   No hydronephrosis      STOMACH AND BOWEL:  Unremarkable      APPENDIX:  A normal appendix is visualized      ABDOMINOPELVIC CAVITY:  No ascites or free intraperitoneal air  No lymphadenopathy      VESSELS:  Unremarkable for patient's age      PELVIS     REPRODUCTIVE ORGANS:  Heterogeneous enhancement of the uterine parenchyma, which may reflect underlying fibroids  No adnexal abnormality      URINARY BLADDER:  Unremarkable      ABDOMINAL WALL/INGUINAL REGIONS:  Unremarkable      OSSEOUS STRUCTURES:  No acute fracture or destructive osseous lesion      IMPRESSION:     No acute pathology      Findings suggestive of uterine fibroids  Consider follow-up nonemergent ultrasound for further evaluation

## 2019-11-06 ENCOUNTER — HOSPITAL ENCOUNTER (EMERGENCY)
Facility: HOSPITAL | Age: 39
Discharge: HOME | End: 2019-11-06
Attending: EMERGENCY MEDICINE
Payer: COMMERCIAL

## 2019-11-06 ENCOUNTER — TELEPHONE (OUTPATIENT)
Dept: GASTROENTEROLOGY | Facility: CLINIC | Age: 39
End: 2019-11-06

## 2019-11-06 VITALS
HEART RATE: 75 BPM | RESPIRATION RATE: 16 BRPM | WEIGHT: 143.3 LBS | SYSTOLIC BLOOD PRESSURE: 109 MMHG | BODY MASS INDEX: 25.38 KG/M2 | TEMPERATURE: 97.3 F | OXYGEN SATURATION: 99 % | DIASTOLIC BLOOD PRESSURE: 64 MMHG

## 2019-11-06 DIAGNOSIS — H60.90 OTITIS EXTERNA: ICD-10-CM

## 2019-11-06 DIAGNOSIS — H61.20 CERUMEN DEBRIS ON TYMPANIC MEMBRANE: Primary | ICD-10-CM

## 2019-11-06 PROCEDURE — 99283 EMERGENCY DEPT VISIT LOW MDM: CPT | Performed by: PHYSICIAN ASSISTANT

## 2019-11-06 PROCEDURE — 99282 EMERGENCY DEPT VISIT SF MDM: CPT

## 2019-11-06 RX ORDER — IBUPROFEN 400 MG/1
800 TABLET ORAL ONCE
Status: COMPLETED | OUTPATIENT
Start: 2019-11-06 | End: 2019-11-06

## 2019-11-06 RX ORDER — OFLOXACIN 3 MG/ML
5 SOLUTION AURICULAR (OTIC) 2 TIMES DAILY
Qty: 5 ML | Refills: 0 | Status: SHIPPED | OUTPATIENT
Start: 2019-11-06

## 2019-11-06 RX ADMIN — IBUPROFEN 800 MG: 400 TABLET ORAL at 10:11

## 2019-11-06 NOTE — ED PROVIDER NOTES
History  Chief Complaint   Patient presents with   Rosy Sanchez     " since last night " " can't hear right"  c/o pain to right ear      This is a 50-year-old female who presents today for evaluation of right-sided otalgia decreased ability to hear that since last night  Patient reports she did not stick anything in her ear  She reports a foreign body sensation  She denies any fevers  No nausea, vomiting, diarrhea  No cough  She reports he has not taken anything for pain  Earache       Prior to Admission Medications   Prescriptions Last Dose Informant Patient Reported? Taking?   famotidine (PEPCID) 20 mg tablet   No No   Sig: Take 1 tablet (20 mg total) by mouth 2 (two) times a day   gabapentin (NEURONTIN) 300 mg capsule   No No   Sig: Take 1 cap at bedtime initially  Then 2 times a day after 5 days   Then 3 times a day after 5 days   meloxicam (MOBIC) 7 5 mg tablet   No No   Sig: Take 1 tablet (7 5 mg total) by mouth daily   methocarbamol (ROBAXIN) 500 mg tablet   No No   Sig: Take 1 tablet (500 mg total) by mouth 3 (three) times a day   metoclopramide (REGLAN) 10 mg tablet   No No   Sig: Take 1 tablet (10 mg total) by mouth every 6 (six) hours   norgestimate-ethinyl estradiol (ORTHO TRI-CYCLEN,TRINESSA) 0 18/0 215/0 25 MG-35 MCG per tablet   Yes No   Si tablet every 24 hours   oxyCODONE-acetaminophen (PERCOCET) 5-325 mg per tablet   No No   Sig: Take 1 tablet by mouth every 6 (six) hours as needed for moderate pain (as needed for pain) Max Daily Amount: 4 tablets   pantoprazole (PROTONIX) 40 mg tablet   No No   Sig: Take 1 tablet (40 mg total) by mouth daily before breakfast   ranitidine (ZANTAC) 300 MG capsule   No No   Sig: Take 1 capsule (300 mg total) by mouth every 24 hours   sucralfate (CARAFATE) 1 g tablet   No No   Sig: Take 1 tablet (1 g total) by mouth 4 (four) times a day   sucralfate (CARAFATE) 1 g/10 mL suspension   No No   Sig: Take 10 mL (1 g total) by mouth 4 (four) times a day Facility-Administered Medications: None       Past Medical History:   Diagnosis Date    Chronic back pain     Depression     Gastritis        History reviewed  No pertinent surgical history  Family History   Problem Relation Age of Onset    Stroke Mother     Hypertension Family     Hypertension Brother      I have reviewed and agree with the history as documented  Social History     Tobacco Use    Smoking status: Never Smoker    Smokeless tobacco: Never Used   Substance Use Topics    Alcohol use: Not Currently     Frequency: Never    Drug use: Not Currently     Comment: per pt used cocaine once 2016        Review of Systems   Constitutional: Negative  HENT: Positive for ear pain  Eyes: Negative  Respiratory: Negative  Cardiovascular: Negative  Gastrointestinal: Negative  Endocrine: Negative  Genitourinary: Negative  Musculoskeletal: Negative  Skin: Negative  Allergic/Immunologic: Negative  Neurological: Negative  Hematological: Negative  Psychiatric/Behavioral: Negative  Physical Exam  Physical Exam   Constitutional: She appears well-developed and well-nourished  HENT:   Head: Normocephalic and atraumatic  Right Ear: Tympanic membrane and external ear normal  No mastoid tenderness  Left Ear: No mastoid tenderness  Patient has no bogginess of right auditory canal   There is noted multiple hard pieces of cerumen present in the canal   No impaction  Cardiovascular: Normal rate, regular rhythm and normal heart sounds  Pulmonary/Chest: Effort normal and breath sounds normal    Abdominal: Soft         Vital Signs  ED Triage Vitals   Temperature Pulse Respirations Blood Pressure SpO2   11/06/19 0946 11/06/19 0946 11/06/19 0946 11/06/19 0946 11/06/19 0946   (!) 97 3 °F (36 3 °C) 75 16 109/64 99 %      Temp Source Heart Rate Source Patient Position - Orthostatic VS BP Location FiO2 (%)   11/06/19 0946 11/06/19 0946 11/06/19 0946 -- --   Tympanic Monitor Sitting        Pain Score       11/06/19 1011       7           Vitals:    11/06/19 0946   BP: 109/64   Pulse: 75   Patient Position - Orthostatic VS: Sitting         Visual Acuity      ED Medications  Medications   ibuprofen (MOTRIN) tablet 800 mg (800 mg Oral Given 11/6/19 1011)       Diagnostic Studies  Results Reviewed     None                 No orders to display              Procedures  Procedures       ED Course                               MDM  Number of Diagnoses or Management Options  Cerumen debris on tympanic membrane:   Otitis externa:   Diagnosis management comments: Attempted to remove cerumen  However, given that it is hard only removed small piece  Recommend patient use Debrox and ofloxacin audit drops given otitis externa  I reviewed strict indications on if and when to return to the emergency department with patient  Recommend patient follow up with primary care provider in a week or so for cerumen removal   Patient was discharged home stable  Disposition  Final diagnoses:   Cerumen debris on tympanic membrane   Otitis externa     Time reflects when diagnosis was documented in both MDM as applicable and the Disposition within this note     Time User Action Codes Description Comment    11/6/2019 10:04 AM [place]  R Add [H61 20] Cerumen debris on tympanic membrane     11/6/2019 10:05 AM Vern[place]  R Add [H60 90] Otitis externa       ED Disposition     ED Disposition Condition Date/Time Comment    Discharge Stable Wed Nov 6, 2019 10:04 AM Tray Welch discharge to home/self care              Follow-up Information    None         Discharge Medication List as of 11/6/2019 10:06 AM      START taking these medications    Details   carbamide peroxide (DEBROX) 6 5 % otic solution Administer 5 drops into ears daily, Starting Wed 11/6/2019, Print      ofloxacin (FLOXIN) 0 3 % otic solution Administer 5 drops into ears 2 (two) times a day, Starting Wed 11/6/2019, Print         CONTINUE these medications which have NOT CHANGED    Details   famotidine (PEPCID) 20 mg tablet Take 1 tablet (20 mg total) by mouth 2 (two) times a day, Starting Mon 10/21/2019, Print      gabapentin (NEURONTIN) 300 mg capsule Take 1 cap at bedtime initially  Then 2 times a day after 5 days  Then 3 times a day after 5 days, Normal      meloxicam (MOBIC) 7 5 mg tablet Take 1 tablet (7 5 mg total) by mouth daily, Starting Thu 8/15/2019, Normal      methocarbamol (ROBAXIN) 500 mg tablet Take 1 tablet (500 mg total) by mouth 3 (three) times a day, Starting Thu 8/15/2019, Normal      metoclopramide (REGLAN) 10 mg tablet Take 1 tablet (10 mg total) by mouth every 6 (six) hours, Starting Mon 10/21/2019, Print      norgestimate-ethinyl estradiol (ORTHO TRI-CYCLEN,TRINESSA) 0 18/0 215/0 25 MG-35 MCG per tablet 1 tablet every 24 hours, Starting Tue 4/24/2018, Historical Med      oxyCODONE-acetaminophen (PERCOCET) 5-325 mg per tablet Take 1 tablet by mouth every 6 (six) hours as needed for moderate pain (as needed for pain) Max Daily Amount: 4 tablets, Starting Mon 11/5/2018, Normal      pantoprazole (PROTONIX) 40 mg tablet Take 1 tablet (40 mg total) by mouth daily before breakfast, Starting Thu 8/15/2019, Normal      ranitidine (ZANTAC) 300 MG capsule Take 1 capsule (300 mg total) by mouth every 24 hours, Starting Thu 8/15/2019, Normal      sucralfate (CARAFATE) 1 g tablet Take 1 tablet (1 g total) by mouth 4 (four) times a day, Starting Mon 10/21/2019, Print      sucralfate (CARAFATE) 1 g/10 mL suspension Take 10 mL (1 g total) by mouth 4 (four) times a day, Starting Thu 8/15/2019, Normal           No discharge procedures on file      ED Provider  Electronically Signed by           Marce Hwang PA-C  11/06/19 5108

## 2019-11-09 ENCOUNTER — HOSPITAL ENCOUNTER (OUTPATIENT)
Dept: ULTRASOUND IMAGING | Facility: HOSPITAL | Age: 39
Discharge: HOME | End: 2019-11-09
Payer: COMMERCIAL

## 2019-11-09 DIAGNOSIS — R10.2 PELVIC PAIN: ICD-10-CM

## 2019-11-09 PROCEDURE — 76856 US EXAM PELVIC COMPLETE: CPT

## 2019-11-11 ENCOUNTER — HOSPITAL ENCOUNTER (EMERGENCY)
Facility: HOSPITAL | Age: 39
Discharge: HOME | End: 2019-11-11
Attending: EMERGENCY MEDICINE
Payer: COMMERCIAL

## 2019-11-11 VITALS
BODY MASS INDEX: 25.77 KG/M2 | RESPIRATION RATE: 16 BRPM | WEIGHT: 145.5 LBS | SYSTOLIC BLOOD PRESSURE: 127 MMHG | TEMPERATURE: 97.9 F | OXYGEN SATURATION: 99 % | DIASTOLIC BLOOD PRESSURE: 64 MMHG | HEART RATE: 89 BPM

## 2019-11-11 DIAGNOSIS — H61.22 IMPACTED CERUMEN OF LEFT EAR: Primary | ICD-10-CM

## 2019-11-11 DIAGNOSIS — T16.1XXA FOREIGN BODY OF RIGHT EAR, INITIAL ENCOUNTER: ICD-10-CM

## 2019-11-11 PROCEDURE — 69200 CLEAR OUTER EAR CANAL: CPT | Performed by: PHYSICIAN ASSISTANT

## 2019-11-11 PROCEDURE — 99281 EMR DPT VST MAYX REQ PHY/QHP: CPT | Performed by: PHYSICIAN ASSISTANT

## 2019-11-11 PROCEDURE — 99282 EMERGENCY DEPT VISIT SF MDM: CPT

## 2019-11-11 RX ORDER — MINERAL OIL
OIL (ML) MISCELLANEOUS ONCE
Status: COMPLETED | OUTPATIENT
Start: 2019-11-11 | End: 2019-11-11

## 2019-11-11 RX ADMIN — Medication: at 18:49

## 2019-11-12 NOTE — ED PROVIDER NOTES
History  Chief Complaint   Patient presents with    Foreign Body in Ear     Patient believes she has a cockroach in her R ear, family member states she looked inside her ear and saw the insect  Bug in her ear x4 days to the right ear was seen and told she just had cerumen and so nothing was done patient states she feels feels like there is something in her year she believes it is bug and so she came in for evaluation  Prior to Admission Medications   Prescriptions Last Dose Informant Patient Reported? Taking?   carbamide peroxide (DEBROX) 6 5 % otic solution   No No   Sig: Administer 5 drops into ears daily   famotidine (PEPCID) 20 mg tablet   No No   Sig: Take 1 tablet (20 mg total) by mouth 2 (two) times a day   gabapentin (NEURONTIN) 300 mg capsule   No No   Sig: Take 1 cap at bedtime initially  Then 2 times a day after 5 days   Then 3 times a day after 5 days   meloxicam (MOBIC) 7 5 mg tablet   No No   Sig: Take 1 tablet (7 5 mg total) by mouth daily   methocarbamol (ROBAXIN) 500 mg tablet   No No   Sig: Take 1 tablet (500 mg total) by mouth 3 (three) times a day   metoclopramide (REGLAN) 10 mg tablet   No No   Sig: Take 1 tablet (10 mg total) by mouth every 6 (six) hours   norgestimate-ethinyl estradiol (ORTHO TRI-CYCLEN,TRINESSA) 0 18/0 215/0 25 MG-35 MCG per tablet   Yes No   Si tablet every 24 hours   ofloxacin (FLOXIN) 0 3 % otic solution   No No   Sig: Administer 5 drops into ears 2 (two) times a day   oxyCODONE-acetaminophen (PERCOCET) 5-325 mg per tablet   No No   Sig: Take 1 tablet by mouth every 6 (six) hours as needed for moderate pain (as needed for pain) Max Daily Amount: 4 tablets   pantoprazole (PROTONIX) 40 mg tablet   No No   Sig: Take 1 tablet (40 mg total) by mouth daily before breakfast   ranitidine (ZANTAC) 300 MG capsule   No No   Sig: Take 1 capsule (300 mg total) by mouth every 24 hours   sucralfate (CARAFATE) 1 g tablet   No No   Sig: Take 1 tablet (1 g total) by mouth 4 (four) times a day   sucralfate (CARAFATE) 1 g/10 mL suspension   No No   Sig: Take 10 mL (1 g total) by mouth 4 (four) times a day      Facility-Administered Medications: None       Past Medical History:   Diagnosis Date    Chronic back pain     Depression     Gastritis        History reviewed  No pertinent surgical history  Family History   Problem Relation Age of Onset    Stroke Mother     Hypertension Family     Hypertension Brother      I have reviewed and agree with the history as documented      Social History     Tobacco Use    Smoking status: Never Smoker    Smokeless tobacco: Never Used   Substance Use Topics    Alcohol use: Not Currently     Frequency: Never    Drug use: Not Currently     Comment: per pt used cocaine once 2016        Review of Systems    Physical Exam  Physical Exam    Vital Signs  ED Triage Vitals [11/11/19 1832]   Temperature Pulse Respirations Blood Pressure SpO2   97 9 °F (36 6 °C) 89 16 127/64 99 %      Temp Source Heart Rate Source Patient Position - Orthostatic VS BP Location FiO2 (%)   Temporal Monitor Sitting Right arm --      Pain Score       8           Vitals:    11/11/19 1832   BP: 127/64   Pulse: 89   Patient Position - Orthostatic VS: Sitting         Visual Acuity      ED Medications  Medications   mineral oil light (MURI-LUBE) oil ( Topical Given 11/11/19 1849)       Diagnostic Studies  Results Reviewed     None                 No orders to display              Procedures  Foreign Body - Orifice  Date/Time: 11/11/2019 7:32 PM  Performed by: Constanza Steward PA-C  Authorized by: Constanza Steward PA-C     Patient location:  ED  Consent:     Consent obtained:  Verbal    Consent given by:  Patient    Risks discussed:  Bleeding, TM perforation and worsening of condition  Universal protocol:     Patient identity confirmed:  Verbally with patient  Location:     Location:  Ear  Pre-procedure details:     Imaging:  None  Anesthesia (see MAR for exact dosages): Topical anesthetic:  None  Procedure details:     Localization method:  Direct visualization    Removal mechanism:  Suction    Procedure complexity:  Complex    Foreign bodies recovered:  1    Description:  Bug- large winged insect     Intact foreign body removal: yes (1 additional leg also needed to be removed )    Post-procedure details:     Confirmation:  No additional foreign bodies on visualization    Patient tolerance of procedure: Tolerated well, no immediate complications           ED Course                               MDM  Number of Diagnoses or Management Options  Foreign body of right ear, initial encounter: new and does not require workup  Impacted cerumen of left ear: new and does not require workup  Risk of Complications, Morbidity, and/or Mortality  General comments: After removal pt felt much better! Instructions reviewed  Patient Progress  Patient progress: improved      Disposition  Final diagnoses:   Impacted cerumen of left ear   Foreign body of right ear, initial encounter     Time reflects when diagnosis was documented in both MDM as applicable and the Disposition within this note     Time User Action Codes Description Comment    11/11/2019  7:11 PM Selma Morris Add [T16  2XXA] Foreign body of left ear, initial encounter     11/11/2019  7:11 PM Semla Morris Add [H61 22] Impacted cerumen of left ear     11/11/2019  7:11 PM Selma Morris Modify [H61 22] Impacted cerumen of left ear     11/11/2019  7:11 PM Selma Morris Remove [T16  2XXA] Foreign body of left ear, initial encounter     11/11/2019  7:11 PM Selma Morris Add [T16  1XXA] Foreign body of right ear, initial encounter       ED Disposition     ED Disposition Condition Date/Time Comment    Discharge Stable Mon Nov 11, 2019  7:11 PM Brannon Beyer discharge to home/self care              Follow-up Information     Follow up With Specialties Details [address]    [phone]            Discharge Medication List as of 11/11/2019  7:11 PM      CONTINUE these medications which have NOT CHANGED    Details   carbamide peroxide (DEBROX) 6 5 % otic solution Administer 5 drops into ears daily, Starting Wed 11/6/2019, Print      famotidine (PEPCID) 20 mg tablet Take 1 tablet (20 mg total) by mouth 2 (two) times a day, Starting Mon 10/21/2019, Print      gabapentin (NEURONTIN) 300 mg capsule Take 1 cap at bedtime initially  Then 2 times a day after 5 days  Then 3 times a day after 5 days, Normal      meloxicam (MOBIC) 7 5 mg tablet Take 1 tablet (7 5 mg total) by mouth daily, Starting Thu 8/15/2019, Normal      methocarbamol (ROBAXIN) 500 mg tablet Take 1 tablet (500 mg total) by mouth 3 (three) times a day, Starting Thu 8/15/2019, Normal      metoclopramide (REGLAN) 10 mg tablet Take 1 tablet (10 mg total) by mouth every 6 (six) hours, Starting Mon 10/21/2019, Print      norgestimate-ethinyl estradiol (ORTHO TRI-CYCLEN,TRINESSA) 0 18/0 215/0 25 MG-35 MCG per tablet 1 tablet every 24 hours, Starting Tue 4/24/2018, Historical Med      ofloxacin (FLOXIN) 0 3 % otic solution Administer 5 drops into ears 2 (two) times a day, Starting Wed 11/6/2019, Print      oxyCODONE-acetaminophen (PERCOCET) 5-325 mg per tablet Take 1 tablet by mouth every 6 (six) hours as needed for moderate pain (as needed for pain) Max Daily Amount: 4 tablets, Starting Mon 11/5/2018, Normal      pantoprazole (PROTONIX) 40 mg tablet Take 1 tablet (40 mg total) by mouth daily before breakfast, Starting Thu 8/15/2019, Normal      ranitidine (ZANTAC) 300 MG capsule Take 1 capsule (300 mg total) by mouth every 24 hours, Starting Thu 8/15/2019, Normal      sucralfate (CARAFATE) 1 g tablet Take 1 tablet (1 g total) by mouth 4 (four) times a day, Starting Mon 10/21/2019, Print      sucralfate (CARAFATE) 1 g/10 mL suspension Take 10 mL (1 g total) by mouth 4 (four) times a day, Starting u 8/15/2019, Normal           No discharge procedures on file      ED Provider  Electronically Signed by           Frances Lopez PA-C  11/11/19 1691

## 2019-11-14 ENCOUNTER — OFFICE VISIT (OUTPATIENT)
Dept: OBGYN CLINIC | Facility: CLINIC | Age: 39
End: 2019-11-14

## 2019-11-14 VITALS
SYSTOLIC BLOOD PRESSURE: 128 MMHG | WEIGHT: 148 LBS | HEART RATE: 76 BPM | DIASTOLIC BLOOD PRESSURE: 77 MMHG | BODY MASS INDEX: 26.22 KG/M2

## 2019-11-14 DIAGNOSIS — R10.2 PELVIC PAIN: Primary | ICD-10-CM

## 2019-11-14 DIAGNOSIS — R30.0 DYSURIA: ICD-10-CM

## 2019-11-14 LAB
SL AMB  POCT GLUCOSE, UA: NEGATIVE
SL AMB LEUKOCYTE ESTERASE,UA: NEGATIVE
SL AMB POCT BLOOD,UA: NEGATIVE
SL AMB POCT CLARITY,UA: CLEAR
SL AMB POCT COLOR,UA: YELLOW
SL AMB POCT KETONES,UA: NEGATIVE
SL AMB POCT NITRITE,UA: NEGATIVE
SL AMB POCT PH,UA: 7.5
SL AMB POCT SPECIFIC GRAVITY,UA: 1.01
SL AMB POCT URINE PROTEIN: NEGATIVE

## 2019-11-14 PROCEDURE — 81002 URINALYSIS NONAUTO W/O SCOPE: CPT | Performed by: OBSTETRICS & GYNECOLOGY

## 2019-11-14 PROCEDURE — 99213 OFFICE O/P EST LOW 20 MIN: CPT | Performed by: OBSTETRICS & GYNECOLOGY

## 2019-11-15 NOTE — PROGRESS NOTES
Assessment:  35 y o   who presents for follow up for ultrasound and lab results  Plan:  1  Pelvic pain: Discussed with patient that her workup is negative  Ultrasound shows a 2cm intramural fibroid but this is unlikely to be the cause of her pain  Patient reports that her pain did respond to OCPs in the past but would like something more longer acting  Patient denies any contraindication to estrogen (declines smoking, migraines with aura, hx of blood clots, and hx of DM or HTN) Will provide Rx for patch today  Advised to continue taking naproxen before menses start  2  Pain with bowel movement: Patient has an appointment with GI for follow up  She mentioned she did have polyps removed 8 years ago  3  Dysuria: UA dip was negative  Patient has hba1c of 6 1, which is pre-diabetic  Should follow up with PCP  4  Discussed with patient that if her GI workup is negative and she continues to have pain despite the patch and naproxen, can consider diagnostic laparoscopy at that time  D/w Dr Yony Stephens  2019        __________________________________________________________________    Subjective   Shayan Pepper is a 35 y o   who presents for ultrasound and lab results  In summary, patient has been having painful irregular heavy menses for the past month, despite naproxen used  Patient has a history of ovarian cyst and OCPs had helped in the past      Patient also reports having painful bowel movement and urinary frequency  Patient states that when she got her ultrasound, the technician mentioned they saw something in her intestines and should get follow-up  Patient has a GI appointment on   The following portions of the patient's history were reviewed and updated as appropriate: allergies, current medications, past family history, past medical history, past social history, past surgical history and problem list     Review of Systems  Pertinent items are noted in HPI  Objective  Vitals:    11/14/19 1458   BP: 128/77   Pulse: 76       Physical Exam:  General:   alert and oriented, in no acute distress       Lab Review  Labs:     Recent Results (from the past 672 hour(s))   ED Urine Macroscopic    Collection Time: 10/21/19  6:57 AM   Result Value Ref Range    Color, UA Yellow     Clarity, UA Clear     pH, UA 5 5 4 5 - 8 0    Leukocytes, UA Trace (A) Negative    Nitrite, UA Negative Negative    Protein, UA Negative Negative mg/dl    Glucose, UA Negative Negative mg/dl    Ketones, UA Negative Negative mg/dl    Urobilinogen, UA 0 2 0 2, 1 0 E U /dl E U /dl    Bilirubin, UA Negative Negative    Blood, UA Negative Negative    Specific Gravity, UA 1 010 1 003 - 1 030   Urine Microscopic    Collection Time: 10/21/19  6:57 AM   Result Value Ref Range    RBC, UA None Seen None Seen, 0-5 /hpf    WBC, UA 2-4 (A) None Seen, 0-5, 5-55, 5-65 /hpf    Epithelial Cells Occasional None Seen, Occasional /hpf    Bacteria, UA None Seen None Seen, Occasional /hpf   POCT pregnancy, urine    Collection Time: 10/21/19  6:58 AM   Result Value Ref Range    EXT PREG TEST UR (Ref: Negative) Negative     Control Valid    POCT urinalysis dipstick    Collection Time: 10/21/19  7:04 AM   Result Value Ref Range    Color, UA Yellow    CBC and differential    Collection Time: 10/21/19  7:05 AM   Result Value Ref Range    WBC 7 22 4 31 - 10 16 Thousand/uL    RBC 4 27 3 81 - 5 12 Million/uL    Hemoglobin 12 9 11 5 - 15 4 g/dL    Hematocrit 40 3 34 8 - 46 1 %    MCV 94 82 - 98 fL    MCH 30 2 26 8 - 34 3 pg    MCHC 32 0 31 4 - 37 4 g/dL    RDW 12 4 11 6 - 15 1 %    MPV 10 2 8 9 - 12 7 fL    Platelets 040 149 - 127 Thousands/uL    nRBC 0 /100 WBCs    Neutrophils Relative 57 43 - 75 %    Immat GRANS % 0 0 - 2 %    Lymphocytes Relative 31 14 - 44 %    Monocytes Relative 8 4 - 12 %    Eosinophils Relative 3 0 - 6 %    Basophils Relative 1 0 - 1 %    Neutrophils Absolute 4 14 1 85 - 7 62 Thousands/µL    Immature Grans Absolute 0 02 0 00 - 0 20 Thousand/uL    Lymphocytes Absolute 2 26 0 60 - 4 47 Thousands/µL    Monocytes Absolute 0 57 0 17 - 1 22 Thousand/µL    Eosinophils Absolute 0 18 0 00 - 0 61 Thousand/µL    Basophils Absolute 0 05 0 00 - 0 10 Thousands/µL   Comprehensive metabolic panel    Collection Time: 10/21/19  7:05 AM   Result Value Ref Range    Sodium 140 136 - 145 mmol/L    Potassium 4 1 3 5 - 5 3 mmol/L    Chloride 105 100 - 108 mmol/L    CO2 27 21 - 32 mmol/L    ANION GAP 8 4 - 13 mmol/L    BUN 15 5 - 25 mg/dL    Creatinine 0 77 0 60 - 1 30 mg/dL    Glucose 98 65 - 140 mg/dL    Calcium 9 0 8 3 - 10 1 mg/dL    AST 17 5 - 45 U/L    ALT 23 12 - 78 U/L    Alkaline Phosphatase 110 46 - 116 U/L    Total Protein 7 0 6 4 - 8 2 g/dL    Albumin 3 6 3 5 - 5 0 g/dL    Total Bilirubin 0 14 (L) 0 20 - 1 00 mg/dL    eGFR 97 ml/min/1 73sq m   Lipase    Collection Time: 10/21/19  7:05 AM   Result Value Ref Range    Lipase 252 73 - 393 u/L   Hemoglobin A1C    Collection Time: 11/01/19  8:39 AM   Result Value Ref Range    Hemoglobin A1C 6 1 4 2 - 6 3 %     mg/dl   TSH, 3rd generation with Free T4 reflex    Collection Time: 11/01/19  8:39 AM   Result Value Ref Range    TSH 3RD GENERATON 1 360 0 358 - 3 740 uIU/mL   POCT urine dip    Collection Time: 11/14/19  3:34 PM   Result Value Ref Range    LEUKOCYTE ESTERASE,UA Negative     NITRITE,UA Negative     POCT URINE PROTEIN Negative      PH,UA 7 5     BLOOD,UA Negative     SPECIFIC GRAVITY,UA 1 010     KETONES,UA Negative     GLUCOSE, UA Negative      COLOR,UA Yellow     CLARITY,UA Clear        Imaging  PELVIC ULTRASOUND, COMPLETE     INDICATION:  35years old  R10 2: Pelvic and perineal pain      COMPARISON: CT scan 10/21/2019      TECHNIQUE:   Transabdominal pelvic ultrasound was performed in sagittal and transverse planes with a curvilinear transducer    Imaging included volumetric sweeps as well as traditional still imaging technique      FINDINGS:     UTERUS:  The uterus is anteverted in position, measuring 11 0 x 5 3 x 6 6 cm  Heterogeneous uterine parenchyma, with a left-sided intramural fibroid identified, measuring 1 7 x 1 8 x 2 0 cm  A nonspecific small myometrial cyst is seen in the posterior body measuring 1 0 x 0 6 1 0 cm  The cervix shows no suspicious abnormality      ENDOMETRIUM:    Normal caliber of for mm  Homogeneous and normal in appearance      OVARIES/ADNEXA:  Right ovary:  4 1 x 2 7 x 3 1 cm  No suspicious right ovarian abnormality  Doppler flow within normal limits      Left ovary:  2 0 x 1 2 x 2 0 cm  No suspicious left ovarian abnormality  Doppler flow within normal limits      No suspicious adnexal mass or loculated collections  There is no free fluid      IMPRESSION:     Small uterine fibroid    Otherwise, unremarkable exam

## 2019-11-15 NOTE — PATIENT INSTRUCTIONS
Nos Estaremos Mudando!!  [address]  y [place], Estamos emocionados en anunciarles que en Diciembre 2019, nos estaremos mudando a haritha nueva facilidad cerca de aqui  La nueva direcciòn es:    [facility] ( [facility] [place])  [address]  [facility]  [facility] [address]    Esten pendientes para mas informaciòn, la cual les compartiremos cuando [employer]  [place] por confiar en nosotros rodriguez cuidado mèdico, esperamos continuar sirvièndoles en el futuro

## 2023-04-23 ENCOUNTER — HOSPITAL ENCOUNTER (EMERGENCY)
Facility: HOSPITAL | Age: 43
Discharge: HOME | End: 2023-04-23
Attending: STUDENT IN AN ORGANIZED HEALTH CARE EDUCATION/TRAINING PROGRAM

## 2023-04-23 VITALS
WEIGHT: 155.87 LBS | TEMPERATURE: 98.8 F | SYSTOLIC BLOOD PRESSURE: 145 MMHG | OXYGEN SATURATION: 97 % | RESPIRATION RATE: 16 BRPM | HEART RATE: 90 BPM | DIASTOLIC BLOOD PRESSURE: 75 MMHG | BODY MASS INDEX: 27.61 KG/M2

## 2023-04-23 DIAGNOSIS — M54.50 ACUTE EXACERBATION OF CHRONIC LOW BACK PAIN: Primary | ICD-10-CM

## 2023-04-23 DIAGNOSIS — G89.29 ACUTE EXACERBATION OF CHRONIC LOW BACK PAIN: Primary | ICD-10-CM

## 2023-04-23 RX ORDER — METHOCARBAMOL 500 MG/1
500 TABLET, FILM COATED ORAL 4 TIMES DAILY
Qty: 28 TABLET | Refills: 0 | Status: SHIPPED | OUTPATIENT
Start: 2023-04-23 | End: 2023-04-23 | Stop reason: SDUPTHER

## 2023-04-23 RX ORDER — ACETAMINOPHEN 325 MG/1
650 TABLET ORAL ONCE
Status: COMPLETED | OUTPATIENT
Start: 2023-04-23 | End: 2023-04-23

## 2023-04-23 RX ORDER — LIDOCAINE 50 MG/G
1 PATCH TOPICAL ONCE
Status: DISCONTINUED | OUTPATIENT
Start: 2023-04-23 | End: 2023-04-23 | Stop reason: HOSPADM

## 2023-04-23 RX ORDER — METHOCARBAMOL 500 MG/1
500 TABLET, FILM COATED ORAL 4 TIMES DAILY
Qty: 28 TABLET | Refills: 0 | Status: SHIPPED | OUTPATIENT
Start: 2023-04-23 | End: 2023-04-30

## 2023-04-23 RX ORDER — DIAZEPAM 5 MG/1
5 TABLET ORAL ONCE
Status: COMPLETED | OUTPATIENT
Start: 2023-04-23 | End: 2023-04-23

## 2023-04-23 RX ADMIN — LIDOCAINE 5% 1 PATCH: 700 PATCH TOPICAL at 17:49

## 2023-04-23 RX ADMIN — ACETAMINOPHEN 650 MG: 325 TABLET ORAL at 17:49

## 2023-04-23 RX ADMIN — DIAZEPAM 5 MG: 5 TABLET ORAL at 17:49

## 2023-04-23 NOTE — Clinical Note
Louann Spencer was seen and treated in our emergency department on 4/23/2023  No restrictions            Diagnosis:     Josephine    He may return on this date: 04/25/2023         If you have any questions or concerns, please don't hesitate to call        Angella Monroe PA-C    ______________________________           _______________          _______________  Hospital Representative                              Date                                Time
